# Patient Record
Sex: FEMALE | Race: BLACK OR AFRICAN AMERICAN | NOT HISPANIC OR LATINO
[De-identification: names, ages, dates, MRNs, and addresses within clinical notes are randomized per-mention and may not be internally consistent; named-entity substitution may affect disease eponyms.]

---

## 2021-07-08 ENCOUNTER — TRANSCRIPTION ENCOUNTER (OUTPATIENT)
Age: 61
End: 2021-07-08

## 2021-09-04 ENCOUNTER — TRANSCRIPTION ENCOUNTER (OUTPATIENT)
Age: 61
End: 2021-09-04

## 2023-02-13 PROBLEM — Z00.00 ENCOUNTER FOR PREVENTIVE HEALTH EXAMINATION: Status: ACTIVE | Noted: 2023-02-13

## 2023-03-27 ENCOUNTER — APPOINTMENT (OUTPATIENT)
Dept: OTOLARYNGOLOGY | Facility: CLINIC | Age: 63
End: 2023-03-27
Payer: COMMERCIAL

## 2023-03-27 DIAGNOSIS — D48.7 NEOPLASM OF UNCERTAIN BEHAVIOR OF OTHER SPECIFIED SITES: ICD-10-CM

## 2023-03-27 PROCEDURE — 99213 OFFICE O/P EST LOW 20 MIN: CPT | Mod: 25

## 2023-03-27 PROCEDURE — 31575 DIAGNOSTIC LARYNGOSCOPY: CPT

## 2023-06-15 ENCOUNTER — TRANSCRIPTION ENCOUNTER (OUTPATIENT)
Age: 63
End: 2023-06-15

## 2023-06-15 VITALS
RESPIRATION RATE: 16 BRPM | HEIGHT: 68 IN | OXYGEN SATURATION: 100 % | SYSTOLIC BLOOD PRESSURE: 144 MMHG | HEART RATE: 72 BPM | DIASTOLIC BLOOD PRESSURE: 83 MMHG | WEIGHT: 214.73 LBS | TEMPERATURE: 97 F

## 2023-06-15 NOTE — ASU PREOP CHECKLIST - NOTHING BY MOUTH SINCE
Doxycycline Pregnancy And Lactation Text: This medication is Pregnancy Category D and not consider safe during pregnancy. It is also excreted in breast milk but is considered safe for shorter treatment courses. Erythromycin Counseling:  I discussed with the patient the risks of erythromycin including but not limited to GI upset, allergic reaction, drug rash, diarrhea, increase in liver enzymes, and yeast infections. Tazorac Counseling:  Patient advised that medication is irritating and drying.  Patient may need to apply sparingly and wash off after an hour before eventually leaving it on overnight.  The patient verbalized understanding of the proper use and possible adverse effects of tazorac.  All of the patient's questions and concerns were addressed. Spironolactone Counseling: Patient advised regarding risks of diarrhea, abdominal pain, hyperkalemia, birth defects (for female patients), liver toxicity and renal toxicity. The patient may need blood work to monitor liver and kidney function and potassium levels while on therapy. The patient verbalized understanding of the proper use and possible adverse effects of spironolactone.  All of the patient's questions and concerns were addressed. 15-Timoteo-2023 20:00 Spironolactone Pregnancy And Lactation Text: This medication can cause feminization of the male fetus and should be avoided during pregnancy. The active metabolite is also found in breast milk. Topical Retinoid Pregnancy And Lactation Text: This medication is Pregnancy Category C. It is unknown if this medication is excreted in breast milk. Doxycycline Counseling:  Patient counseled regarding possible photosensitivity and increased risk for sunburn.  Patient instructed to avoid sunlight, if possible.  When exposed to sunlight, patients should wear protective clothing, sunglasses, and sunscreen.  The patient was instructed to call the office immediately if the following severe adverse effects occur:  hearing changes, easy bruising/bleeding, severe headache, or vision changes.  The patient verbalized understanding of the proper use and possible adverse effects of doxycycline.  All of the patient's questions and concerns were addressed. Dapsone Pregnancy And Lactation Text: This medication is Pregnancy Category C and is not considered safe during pregnancy or breast feeding. Topical Clindamycin Counseling: Patient counseled that this medication may cause skin irritation or allergic reactions.  In the event of skin irritation, the patient was advised to reduce the amount of the drug applied or use it less frequently.   The patient verbalized understanding of the proper use and possible adverse effects of clindamycin.  All of the patient's questions and concerns were addressed. Topical Sulfur Applications Pregnancy And Lactation Text: This medication is Pregnancy Category C and has an unknown safety profile during pregnancy. It is unknown if this topical medication is excreted in breast milk. Bactrim Counseling:  I discussed with the patient the risks of sulfa antibiotics including but not limited to GI upset, allergic reaction, drug rash, diarrhea, dizziness, photosensitivity, and yeast infections.  Rarely, more serious reactions can occur including but not limited to aplastic anemia, agranulocytosis, methemoglobinemia, blood dyscrasias, liver or kidney failure, lung infiltrates or desquamative/blistering drug rashes. Birth Control Pills Pregnancy And Lactation Text: This medication should be avoided if pregnant and for the first 30 days post-partum. Tetracycline Counseling: Patient counseled regarding possible photosensitivity and increased risk for sunburn.  Patient instructed to avoid sunlight, if possible.  When exposed to sunlight, patients should wear protective clothing, sunglasses, and sunscreen.  The patient was instructed to call the office immediately if the following severe adverse effects occur:  hearing changes, easy bruising/bleeding, severe headache, or vision changes.  The patient verbalized understanding of the proper use and possible adverse effects of tetracycline.  All of the patient's questions and concerns were addressed. Patient understands to avoid pregnancy while on therapy due to potential birth defects. Dapsone Counseling: I discussed with the patient the risks of dapsone including but not limited to hemolytic anemia, agranulocytosis, rashes, methemoglobinemia, kidney failure, peripheral neuropathy, headaches, GI upset, and liver toxicity.  Patients who start dapsone require monitoring including baseline LFTs and weekly CBCs for the first month, then every month thereafter.  The patient verbalized understanding of the proper use and possible adverse effects of dapsone.  All of the patient's questions and concerns were addressed. Benzoyl Peroxide Counseling: Patient counseled that medicine may cause skin irritation and bleach clothing.  In the event of skin irritation, the patient was advised to reduce the amount of the drug applied or use it less frequently.   The patient verbalized understanding of the proper use and possible adverse effects of benzoyl peroxide.  All of the patient's questions and concerns were addressed. Erythromycin Pregnancy And Lactation Text: This medication is Pregnancy Category B and is considered safe during pregnancy. It is also excreted in breast milk. High Dose Vitamin A Counseling: Side effects reviewed, pt to contact office should one occur. Use Enhanced Medication Counseling?: No Bactrim Pregnancy And Lactation Text: This medication is Pregnancy Category D and is known to cause fetal risk.  It is also excreted in breast milk. Topical Sulfur Applications Counseling: Topical Sulfur Counseling: Patient counseled that this medication may cause skin irritation or allergic reactions.  In the event of skin irritation, the patient was advised to reduce the amount of the drug applied or use it less frequently.   The patient verbalized understanding of the proper use and possible adverse effects of topical sulfur application.  All of the patient's questions and concerns were addressed. Birth Control Pills Counseling: Birth Control Pill Counseling: I discussed with the patient the potential side effects of OCPs including but not limited to increased risk of stroke, heart attack, thrombophlebitis, deep venous thrombosis, hepatic adenomas, breast changes, GI upset, headaches, and depression.  The patient verbalized understanding of the proper use and possible adverse effects of OCPs. All of the patient's questions and concerns were addressed. Azithromycin Pregnancy And Lactation Text: This medication is considered safe during pregnancy and is also secreted in breast milk. Tetracycline Pregnancy And Lactation Text: This medication is Pregnancy Category D and not consider safe during pregnancy. It is also excreted in breast milk. Detail Level: Zone Topical Retinoid counseling:  Patient advised to apply a pea-sized amount only at bedtime and wait 30 minutes after washing their face before applying.  If too drying, patient may add a non-comedogenic moisturizer. The patient verbalized understanding of the proper use and possible adverse effects of retinoids.  All of the patient's questions and concerns were addressed. Tazorac Pregnancy And Lactation Text: This medication is not safe during pregnancy. It is unknown if this medication is excreted in breast milk. Isotretinoin Pregnancy And Lactation Text: This medication is Pregnancy Category X and is considered extremely dangerous during pregnancy. It is unknown if it is excreted in breast milk. Isotretinoin Counseling: Patient should get monthly blood tests, not donate blood, not drive at night if vision affected, not share medication, and not undergo elective surgery for 6 months after tx completed. Side effects reviewed, pt to contact office should one occur. Minocycline Counseling: Patient advised regarding possible photosensitivity and discoloration of the teeth, skin, lips, tongue and gums.  Patient instructed to avoid sunlight, if possible.  When exposed to sunlight, patients should wear protective clothing, sunglasses, and sunscreen.  The patient was instructed to call the office immediately if the following severe adverse effects occur:  hearing changes, easy bruising/bleeding, severe headache, or vision changes.  The patient verbalized understanding of the proper use and possible adverse effects of minocycline.  All of the patient's questions and concerns were addressed. Azithromycin Counseling:  I discussed with the patient the risks of azithromycin including but not limited to GI upset, allergic reaction, drug rash, diarrhea, and yeast infections. Topical Clindamycin Pregnancy And Lactation Text: This medication is Pregnancy Category B and is considered safe during pregnancy. It is unknown if it is excreted in breast milk. High Dose Vitamin A Pregnancy And Lactation Text: High dose vitamin A therapy is contraindicated during pregnancy and breast feeding. Benzoyl Peroxide Pregnancy And Lactation Text: This medication is Pregnancy Category C. It is unknown if benzoyl peroxide is excreted in breast milk.

## 2023-06-15 NOTE — ASU PATIENT PROFILE, ADULT - FALL HARM RISK - UNIVERSAL INTERVENTIONS
Bed in lowest position, wheels locked, appropriate side rails in place/Call bell, personal items and telephone in reach/Instruct patient to call for assistance before getting out of bed or chair/Non-slip footwear when patient is out of bed/[place] to call system/Physically safe environment - no spills, clutter or unnecessary equipment/Purposeful Proactive Rounding/Room/bathroom lighting operational, light cord in reach

## 2023-06-15 NOTE — ASU PATIENT PROFILE, ADULT - NSICDXPASTSURGICALHX_GEN_ALL_CORE_FT
PAST SURGICAL HISTORY:  H/O colonoscopy     H/O tubal ligation     Hammer toe of left foot hammer toe

## 2023-06-16 ENCOUNTER — RESULT REVIEW (OUTPATIENT)
Age: 63
End: 2023-06-16

## 2023-06-16 ENCOUNTER — TRANSCRIPTION ENCOUNTER (OUTPATIENT)
Age: 63
End: 2023-06-16

## 2023-06-16 ENCOUNTER — APPOINTMENT (OUTPATIENT)
Dept: OTOLARYNGOLOGY | Facility: HOSPITAL | Age: 63
End: 2023-06-16

## 2023-06-16 ENCOUNTER — INPATIENT (INPATIENT)
Facility: HOSPITAL | Age: 63
LOS: 0 days | Discharge: HOME | DRG: 139 | End: 2023-06-17
Attending: SPECIALIST | Admitting: SPECIALIST
Payer: MEDICARE

## 2023-06-16 DIAGNOSIS — Z98.51 TUBAL LIGATION STATUS: Chronic | ICD-10-CM

## 2023-06-16 DIAGNOSIS — Z98.890 OTHER SPECIFIED POSTPROCEDURAL STATES: Chronic | ICD-10-CM

## 2023-06-16 DIAGNOSIS — M20.42 OTHER HAMMER TOE(S) (ACQUIRED), LEFT FOOT: Chronic | ICD-10-CM

## 2023-06-16 PROCEDURE — 88331 PATH CONSLTJ SURG 1 BLK 1SPC: CPT | Mod: 26

## 2023-06-16 PROCEDURE — 88307 TISSUE EXAM BY PATHOLOGIST: CPT | Mod: 26

## 2023-06-16 PROCEDURE — 42415 EXCISE PAROTID GLAND/LESION: CPT | Mod: RT

## 2023-06-16 DEVICE — SURGICEL 4 X 8": Type: IMPLANTABLE DEVICE | Status: FUNCTIONAL

## 2023-06-16 DEVICE — CLIP APPLIER ETHICON LIGACLIP 9 3/8" SMALL: Type: IMPLANTABLE DEVICE | Status: FUNCTIONAL

## 2023-06-16 RX ORDER — MORPHINE SULFATE 50 MG/1
4 CAPSULE, EXTENDED RELEASE ORAL ONCE
Refills: 0 | Status: DISCONTINUED | OUTPATIENT
Start: 2023-06-16 | End: 2023-06-16

## 2023-06-16 RX ORDER — FAMOTIDINE 10 MG/ML
40 INJECTION INTRAVENOUS DAILY
Refills: 0 | Status: DISCONTINUED | OUTPATIENT
Start: 2023-06-16 | End: 2023-06-17

## 2023-06-16 RX ORDER — HEPARIN SODIUM 5000 [USP'U]/ML
5000 INJECTION INTRAVENOUS; SUBCUTANEOUS ONCE
Refills: 0 | Status: DISCONTINUED | OUTPATIENT
Start: 2023-06-17 | End: 2023-06-17

## 2023-06-16 RX ORDER — ACETAMINOPHEN 500 MG
650 TABLET ORAL EVERY 6 HOURS
Refills: 0 | Status: DISCONTINUED | OUTPATIENT
Start: 2023-06-16 | End: 2023-06-17

## 2023-06-16 RX ORDER — ACETAMINOPHEN 500 MG
650 TABLET ORAL ONCE
Refills: 0 | Status: DISCONTINUED | OUTPATIENT
Start: 2023-06-16 | End: 2023-06-16

## 2023-06-16 RX ORDER — AMLODIPINE BESYLATE 2.5 MG/1
5 TABLET ORAL DAILY
Refills: 0 | Status: DISCONTINUED | OUTPATIENT
Start: 2023-06-16 | End: 2023-06-17

## 2023-06-16 RX ORDER — ONDANSETRON 8 MG/1
4 TABLET, FILM COATED ORAL ONCE
Refills: 0 | Status: DISCONTINUED | OUTPATIENT
Start: 2023-06-16 | End: 2023-06-16

## 2023-06-16 RX ORDER — OXYCODONE HYDROCHLORIDE 5 MG/1
5 TABLET ORAL EVERY 4 HOURS
Refills: 0 | Status: DISCONTINUED | OUTPATIENT
Start: 2023-06-16 | End: 2023-06-17

## 2023-06-16 RX ORDER — ONDANSETRON 8 MG/1
4 TABLET, FILM COATED ORAL ONCE
Refills: 0 | Status: DISCONTINUED | OUTPATIENT
Start: 2023-06-16 | End: 2023-06-17

## 2023-06-16 RX ORDER — PANTOPRAZOLE SODIUM 20 MG/1
40 TABLET, DELAYED RELEASE ORAL
Refills: 0 | Status: DISCONTINUED | OUTPATIENT
Start: 2023-06-16 | End: 2023-06-17

## 2023-06-16 RX ORDER — OXYCODONE AND ACETAMINOPHEN 5; 325 MG/1; MG/1
2 TABLET ORAL ONCE
Refills: 0 | Status: DISCONTINUED | OUTPATIENT
Start: 2023-06-16 | End: 2023-06-16

## 2023-06-16 RX ORDER — LANOLIN ALCOHOL/MO/W.PET/CERES
3 CREAM (GRAM) TOPICAL AT BEDTIME
Refills: 0 | Status: DISCONTINUED | OUTPATIENT
Start: 2023-06-16 | End: 2023-06-17

## 2023-06-16 RX ORDER — CALCIUM CARBONATE 500(1250)
2 TABLET ORAL EVERY 4 HOURS
Refills: 0 | Status: DISCONTINUED | OUTPATIENT
Start: 2023-06-16 | End: 2023-06-17

## 2023-06-16 RX ORDER — SODIUM CHLORIDE 9 MG/ML
1000 INJECTION, SOLUTION INTRAVENOUS
Refills: 0 | Status: DISCONTINUED | OUTPATIENT
Start: 2023-06-16 | End: 2023-06-16

## 2023-06-16 RX ADMIN — FAMOTIDINE 40 MILLIGRAM(S): 10 INJECTION INTRAVENOUS at 23:17

## 2023-06-16 RX ADMIN — SODIUM CHLORIDE 75 MILLILITER(S): 9 INJECTION, SOLUTION INTRAVENOUS at 13:42

## 2023-06-16 NOTE — H&P ADULT - NSHPPHYSICALEXAM_GEN_ALL_CORE
Gen: recent anesthesia  Oral: no bleeding, no masses  Neck: incision c/d/i, ROZ x1 holding suction with sanguinous output, neck soft and flat  Resp: NLB

## 2023-06-16 NOTE — H&P ADULT - HISTORY OF PRESENT ILLNESS
63F with parotid mass s/p excision of tail of parotid mass. No intra op complications - see op note for details.

## 2023-06-16 NOTE — H&P ADULT - ASSESSMENT
63F w/ hx HTN s/p excision of tail of parotid mass. Frozen section c/w pleomorphic adenoma  -Admit to [facility] ([facility]  -Pain control  -monitor ROZ  -continue amlodipine 5 mg for htn   -SCD, SQH  -diet as tolerated  -ambulate  -Dispo pending post operative course and drain monitoring

## 2023-06-17 ENCOUNTER — TRANSCRIPTION ENCOUNTER (OUTPATIENT)
Age: 63
End: 2023-06-17

## 2023-06-17 VITALS
OXYGEN SATURATION: 97 % | SYSTOLIC BLOOD PRESSURE: 156 MMHG | HEART RATE: 66 BPM | RESPIRATION RATE: 18 BRPM | DIASTOLIC BLOOD PRESSURE: 85 MMHG | TEMPERATURE: 98 F

## 2023-06-17 PROCEDURE — 88307 TISSUE EXAM BY PATHOLOGIST: CPT

## 2023-06-17 PROCEDURE — C1889: CPT

## 2023-06-17 PROCEDURE — 88331 PATH CONSLTJ SURG 1 BLK 1SPC: CPT

## 2023-06-17 RX ORDER — OXYCODONE HYDROCHLORIDE 5 MG/1
1 TABLET ORAL
Qty: 12 | Refills: 0
Start: 2023-06-17 | End: 2023-06-19

## 2023-06-17 RX ADMIN — PANTOPRAZOLE SODIUM 40 MILLIGRAM(S): 20 TABLET, DELAYED RELEASE ORAL at 06:16

## 2023-06-17 RX ADMIN — Medication 650 MILLIGRAM(S): at 12:55

## 2023-06-17 RX ADMIN — AMLODIPINE BESYLATE 5 MILLIGRAM(S): 2.5 TABLET ORAL at 06:16

## 2023-06-17 RX ADMIN — Medication 650 MILLIGRAM(S): at 12:13

## 2023-06-17 NOTE — DISCHARGE NOTE PROVIDER - NSDCFUADDINST_GEN_ALL_CORE_FT
Pain control:  Please continue to take tylenol as needed for pain. DO NOT exceed 4000 mg per day. A prescription for oxycodone has been sent, please take as needed for more severe pain.    Medications:  Please continue to take all of your other medications as prescribed.     Diet: Please resume your regular diet.    Please avoid heavy lifting and strenuous activity for 2 weeks following your surgery.    Incision care:  Please monitor your incision sites for any sings of infection including redness, swelling, discharge, or increased pain. If you experience any of these please contact your doctor.  Please avoid swimming and bathing until your follow up appointment. You may shower starting 48 hours after surgery, allow soap and water to flow over the wound, do not scrub, pat dry when done.

## 2023-06-17 NOTE — DISCHARGE NOTE NURSING/CASE MANAGEMENT/SOCIAL WORK - PATIENT PORTAL LINK FT
You can access the FollowMyHealth Patient Portal offered by [facility] by registering at the following website: [email or web address]. By joining [facility]’s FollowMyHealth portal, you will also be able to view your health information using other applications (apps) compatible with our system.

## 2023-06-17 NOTE — PROGRESS NOTE ADULT - SUBJECTIVE AND OBJECTIVE BOX
OTOLARYNGOLOGY (ENT) PROGRESS NOTE    PATIENT: SATHYA DEMARCO  MRN: [id number]  : 56  [id number]  DATE OF SERVICE:  23  			         ID:SATHYA DEMARCO is a  63yFemale with R parotid mass now s/p excison of R parotid tail mass , with frozen positive for pleomorphic adenoma.    Subjective/ Interval: Patient seen and examined at bedside. She reports pain well controlled. She is ambulating well. She is tolerating regular diet and feels ready to go home.    ALLERGIES:  No Known Allergies      MEDICATIONS:  Antiinfectives:     IV fluids:    Hematologic/Anticoagulation:  heparin   Injectable 5000 Unit(s) SubCutaneous Once    Pain medications/Neuro:  acetaminophen     Tablet .. 650 milliGRAM(s) Oral every 6 hours PRN  melatonin 3 milliGRAM(s) Oral at bedtime PRN  ondansetron Injectable 4 milliGRAM(s) IV Push Once PRN  oxyCODONE    IR 5 milliGRAM(s) Oral every 4 hours PRN    Endocrine Medications:     All other standing medications:   amLODIPine   Tablet 5 milliGRAM(s) Oral daily  pantoprazole    Tablet 40 milliGRAM(s) Oral before breakfast    All other PRN medications:  calcium carbonate    500 mG (Tums) Chewable 2 Tablet(s) Chew every 4 hours PRN  famotidine    Tablet 40 milliGRAM(s) Oral daily PRN    Vital Signs Last 24 Hrs  T(C): 36.5 (2023 08:38), Max: 36.5 (2023 13:15)  T(F): 97.7 (2023 08:38), Max: 97.7 (2023 13:15)  HR: 66 (2023 08:38) (64 - 84)  BP: 156/85 (2023 08:38) (107/62 - 156/85)  BP(mean): 87 (2023 14:15) (82 - 92)  RR: 18 (2023 08:38) (13 - 19)  SpO2: 97% (2023 08:38) (96% - 100%)    Parameters below as of 2023 08:38  Patient On (Oxygen Delivery Method): room air          -16 @ 07:01  -  06-17 @ 07:00  --------------------------------------------------------  IN:    Lactated Ringers: 1075 mL  Total IN: 1075 mL    OUT:    Blood Loss (mL): 5 mL    Bulb (mL): 0 mL    Voided (mL): 300 mL  Total OUT: 305 mL    Total NET: 770 mL       @ 07: @ 09:40  --------------------------------------------------------  IN:  Total IN: 0 mL    OUT:    Voided (mL): 400 mL  Total OUT: 400 mL    Total NET: -400 mL          23 @ 07:01  -  23 @ 07:00  --------------------------------------------------------  IN:  Total IN: 0 mL    OUT:    Bulb (mL): 0 mL  Total OUT: 0 mL    Total NET: 0 mL              PHYSICAL EXAM:  General: NAD, A+Ox3  Respiratory: No respiratory distress, stridor, or stertor  Voice quality: normal  Face:  Symmetric, with minimal weakness of R marginal mandibular branch  OU: EOMI  AD: Pinna wnl  AS: Pinna wnl  Nose: nasal cavity clear bilaterally, inferior turbinates normal, mucosa normal without crusting or bleeding  OC/OP: tongue normal, floor of mouth WNL, no masses or lesions, OP clear  Neck: soft/flat, R incision c/d/i, ROZ drain with minimal serosanguinous output         LABS             Coagulation Studies-       Endocrine Panel-                MICROBIOLOGY:        RADIOLOGY & ADDITIONAL STUDIES:

## 2023-06-17 NOTE — DISCHARGE NOTE PROVIDER - NSDCCPTREATMENT_GEN_ALL_CORE_FT
Jessica Adan, Attending Physician: 10M with hx of asthma here for abdominal pain, RLQ. Patient with fever at home 101. +vomiting and diarrhea since Sunday, emesis NBNB. No testicular pain, no dysuria, no rhinorrhea, cough.
PRINCIPAL PROCEDURE  Procedure: Parotid mass excision  Findings and Treatment:

## 2023-06-17 NOTE — DISCHARGE NOTE PROVIDER - NSDCMRMEDTOKEN_GEN_ALL_CORE_FT
amLODIPine 5 mg oral tablet: 1 orally once a day  oxyCODONE 5 mg oral tablet: 1 tab(s) orally every 6 hours as needed for  severe pain MDD: 4  Protonix 40 mg oral delayed release tablet: 1 orally once a day

## 2023-06-17 NOTE — DISCHARGE NOTE PROVIDER - CARE PROVIDER_API CALL
Dave Colvin  Otolaryngology  [address] [place] [address], [address]  [place] [address]  Phone: [phone]  Fax: [phone]  Follow Up Time:

## 2023-06-17 NOTE — DISCHARGE NOTE PROVIDER - HOSPITAL COURSE
63F with parotid mass s/p excision of tail of parotid mass. No intra op complications - see op note for details. On POD1, she was tolerating diet and pain well controlled. Her ROZ drain was removed and she was stable for discharge.

## 2023-06-17 NOTE — PROGRESS NOTE ADULT - ASSESSMENT
Assessment and Plan:  SATHYA DEMARCO is a  63yFemale s/p excision of R parotid tail tumor 6/16.    PLAN:  - will remove ROZ drain this morning  - continue pain control and anti-emetics as needed  - continue home amlodipine  - continue regular diet  - discharge home this morning      [facility] at [phone] with any questions/concerns.    Jory Edgar  06-17-23 @ 09:40

## 2023-06-19 PROBLEM — I10 ESSENTIAL (PRIMARY) HYPERTENSION: Chronic | Status: ACTIVE | Noted: 2023-06-15

## 2023-06-19 PROBLEM — K21.9 GASTRO-ESOPHAGEAL REFLUX DISEASE WITHOUT ESOPHAGITIS: Chronic | Status: ACTIVE | Noted: 2023-06-15

## 2023-06-21 LAB — SURGICAL PATHOLOGY STUDY: SIGNIFICANT CHANGE UP

## 2023-06-22 DIAGNOSIS — I10 ESSENTIAL (PRIMARY) HYPERTENSION: ICD-10-CM

## 2023-06-22 DIAGNOSIS — D11.0 BENIGN NEOPLASM OF PAROTID GLAND: ICD-10-CM

## 2023-06-22 DIAGNOSIS — K21.9 GASTRO-ESOPHAGEAL REFLUX DISEASE WITHOUT ESOPHAGITIS: ICD-10-CM

## 2023-07-05 ENCOUNTER — APPOINTMENT (OUTPATIENT)
Dept: OTOLARYNGOLOGY | Facility: CLINIC | Age: 63
End: 2023-07-05
Payer: MEDICARE

## 2023-07-05 DIAGNOSIS — R13.10 DYSPHAGIA, UNSPECIFIED: ICD-10-CM

## 2023-07-05 DIAGNOSIS — R49.0 DYSPHONIA: ICD-10-CM

## 2023-07-05 PROCEDURE — 31575 DIAGNOSTIC LARYNGOSCOPY: CPT | Mod: 79

## 2023-10-01 NOTE — PRE-ANESTHESIA EVALUATION ADULT - HEIGHT IN INCHES
PROCEDURE DETAILS    Preoperative Diagnosis:  Postlaminectomy syndrome of cervical region, M96.1    Postoperative Diagnosis:  Postlaminectomy syndrome of cervical region, M96.1    Surgeon: Sandra Hernandez  Resident/Fellow/Other Assistant: Shamika Bennett    Procedure:  1. REPLACEMENT OF SPINAL  PUMP    Anesthesia: GA  Estimated Blood Loss: 20  Findings: none         Operative Report:   Patient was taken to the operating room #4 was placed into a supine positioning under general anesthesia patient received 2 g of vancomycin and 3 g of Kefzol  perioperatively the site of the pump was identified local anesthetic injected at the site of the pump but 10 cc of a mixture of 2% lidocaine 0.5% Marcaine with epi incision was achieved with a knife hemostasis was achieved and the pump was delivered from  the pocket the sideport of the pump was accessed and 1 cc of clear CSF was withdrawn the pump was disconnected a new pump Medtronic 20 mL was prepped with a solution of morphine preservative-free 5 mg/mL program to deliver a 0.7 mg of morphine per day  with PTM 0.1 mg locked out for 3 hours maximum 3 injections in 24 hours was set with the alarm date set for December 1, 2023 this pump was connected to the catheter and placed back into the pocket irrigation was achieved and Vanco powder was sprayed into  the wound the wound was closed with 3-0 Vicryl 4-0 Vicryl Dermabond was applied to the skin and an abdominal binder was applied patient awakened from general anesthesia taken to the recovery room allowed to recover for sufficient amount of time and then  was discharged home in stable condition with patient was advised to follow-up with the pain clinic within 2 weeks or if needed any sooner  Thank you for allowing me to participate in the care of this patient    Please note this report has been  produced using speech recognition software and may contain errors related to that system including grammar , punctuation  and  spelling as well as words and phrases that may be inappropriate. If there are questions or concerns, please feel free to contact me to clarify.                        Attestation:   Note Completion:  Attending Attestation I performed the procedure without a resident         Electronic Signatures:  Sandra Hernandez)  (Signed 06-Sep-2023 10:59)   Authored: Post-Operative Note, Chart Review, Note Completion      Last Updated: 06-Sep-2023 10:59 by Sandra Hernandez)    8

## 2023-11-06 ENCOUNTER — APPOINTMENT (OUTPATIENT)
Dept: OTOLARYNGOLOGY | Facility: CLINIC | Age: 63
End: 2023-11-06
Payer: MEDICARE

## 2023-11-06 PROCEDURE — 99214 OFFICE O/P EST MOD 30 MIN: CPT | Mod: 25

## 2023-11-06 PROCEDURE — 31575 DIAGNOSTIC LARYNGOSCOPY: CPT

## 2023-11-07 ENCOUNTER — APPOINTMENT (OUTPATIENT)
Age: 63
End: 2023-11-07
Payer: MEDICARE

## 2023-11-07 VITALS
TEMPERATURE: 97.3 F | BODY MASS INDEX: 33.04 KG/M2 | DIASTOLIC BLOOD PRESSURE: 72 MMHG | HEIGHT: 68 IN | HEART RATE: 100 BPM | WEIGHT: 218 LBS | SYSTOLIC BLOOD PRESSURE: 112 MMHG | OXYGEN SATURATION: 97 % | RESPIRATION RATE: 16 BRPM

## 2023-11-07 DIAGNOSIS — A04.8 OTHER SPECIFIED BACTERIAL INTESTINAL INFECTIONS: ICD-10-CM

## 2023-11-07 PROCEDURE — 99204 OFFICE O/P NEW MOD 45 MIN: CPT

## 2023-12-05 ENCOUNTER — NON-APPOINTMENT (OUTPATIENT)
Age: 63
End: 2023-12-05

## 2024-01-03 ENCOUNTER — APPOINTMENT (OUTPATIENT)
Dept: BREAST CENTER | Facility: CLINIC | Age: 64
End: 2024-01-03
Payer: MEDICARE

## 2024-01-03 ENCOUNTER — APPOINTMENT (OUTPATIENT)
Dept: MAMMOGRAPHY | Facility: HOSPITAL | Age: 64
End: 2024-01-03
Payer: MEDICARE

## 2024-01-03 ENCOUNTER — OUTPATIENT (OUTPATIENT)
Dept: OUTPATIENT SERVICES | Facility: HOSPITAL | Age: 64
LOS: 1 days | End: 2024-01-03
Payer: MEDICARE

## 2024-01-03 ENCOUNTER — RESULT REVIEW (OUTPATIENT)
Age: 64
End: 2024-01-03

## 2024-01-03 VITALS
HEIGHT: 68 IN | SYSTOLIC BLOOD PRESSURE: 139 MMHG | HEART RATE: 114 BPM | WEIGHT: 219 LBS | DIASTOLIC BLOOD PRESSURE: 88 MMHG | BODY MASS INDEX: 33.19 KG/M2

## 2024-01-03 DIAGNOSIS — Z78.9 OTHER SPECIFIED HEALTH STATUS: ICD-10-CM

## 2024-01-03 DIAGNOSIS — Z98.51 TUBAL LIGATION STATUS: Chronic | ICD-10-CM

## 2024-01-03 DIAGNOSIS — M20.42 OTHER HAMMER TOE(S) (ACQUIRED), LEFT FOOT: Chronic | ICD-10-CM

## 2024-01-03 DIAGNOSIS — Z98.890 OTHER SPECIFIED POSTPROCEDURAL STATES: Chronic | ICD-10-CM

## 2024-01-03 DIAGNOSIS — Z86.79 PERSONAL HISTORY OF OTHER DISEASES OF THE CIRCULATORY SYSTEM: ICD-10-CM

## 2024-01-03 PROCEDURE — G0279: CPT

## 2024-01-03 PROCEDURE — G0279: CPT | Mod: 26

## 2024-01-03 PROCEDURE — 77065 DX MAMMO INCL CAD UNI: CPT | Mod: 26,LT

## 2024-01-03 PROCEDURE — 77065 DX MAMMO INCL CAD UNI: CPT

## 2024-01-03 PROCEDURE — 99204 OFFICE O/P NEW MOD 45 MIN: CPT

## 2024-01-03 RX ORDER — LOSARTAN POTASSIUM 50 MG/1
50 TABLET, FILM COATED ORAL
Refills: 0 | Status: ACTIVE | COMMUNITY

## 2024-01-06 NOTE — HISTORY OF PRESENT ILLNESS
[FreeTextEntry1] : 63 year old female, self-referred, who presents for initial evaluation regarding abnormal mammogram. Patient completed her imaging at [facility]. Most recently, patient had a B/L screening mammogram on 11/22/23, BI-RADS 0, incomplete, with questionable L lower outer breast calcifications and advised follow up additional imaging views and possible L breast stereotactic guided core biopsy. Patient reports one incidence of clear, non-spontaneous, non-bloody L nipple discharge, a few weeks ago. Patient denies palpable masses, skin changes.   Patient reports family history of breast cancer in 4 maternal first cousins - age 20's, 30's, 40's and 50's respectively. Denies famhx of ovarian cancer. Patient has not had genetic testing performed.    Arin Lifetime Risk 8.5%

## 2024-01-06 NOTE — PAST MEDICAL HISTORY
[Menarche Age ____] : age at menarche was [unfilled] [Menopause Age____] : age at menopause was [unfilled] [Total Preg ___] : G[unfilled] [Live Births ___] : P[unfilled]  [Age At Live Birth ___] : Age at live birth: [unfilled] [History of Hormone Replacement Treatment] : has no history of hormone replacement treatment [FreeTextEntry6] : No [FreeTextEntry7] : No [FreeTextEntry8] : No

## 2024-01-06 NOTE — DATA REVIEWED
[FreeTextEntry1] : 11/22/23 ([place] [facility]) B/L sMMG: heterogeneously dense. L breast questionable calcifications in lower inner and lower outer breast spanning anterior through mid depth. BI-RADS 0, incomplete FOLLOW UP: additional imaging views and possible L breast stereotactic guided core biopsy.

## 2024-01-06 NOTE — ASSESSMENT
[FreeTextEntry1] : 63 year old female here for initial evaluation regarding abnormal mammogram on 11/22/23, BI-RADS 0, incomplete, with L lower outer breast calcifications, possibly requiring L breast stereotactic guided core biopsy.  I personally reviewed her mammogram and feel the calcifications are very suspicious for cancer.   PRISCILLA 8.5%. Patient with one incidence of clear, non-spontaneous, non-bloody L nipple discharge. Physical exam WNL. Most recent imaging reviewed, B/L sMMG 11/22/23, BIRADS-0. Plan for L diagnostic mammogram today and re-examination in 2 weeks.  Patient verbalizes understanding and is in agreement with the plan.

## 2024-01-12 ENCOUNTER — RESULT REVIEW (OUTPATIENT)
Age: 64
End: 2024-01-12

## 2024-01-12 ENCOUNTER — APPOINTMENT (OUTPATIENT)
Dept: MAMMOGRAPHY | Facility: HOSPITAL | Age: 64
End: 2024-01-12
Payer: MEDICARE

## 2024-01-12 ENCOUNTER — OUTPATIENT (OUTPATIENT)
Dept: OUTPATIENT SERVICES | Facility: HOSPITAL | Age: 64
LOS: 1 days | End: 2024-01-12
Payer: MEDICARE

## 2024-01-12 DIAGNOSIS — Z98.51 TUBAL LIGATION STATUS: Chronic | ICD-10-CM

## 2024-01-12 DIAGNOSIS — Z98.890 OTHER SPECIFIED POSTPROCEDURAL STATES: Chronic | ICD-10-CM

## 2024-01-12 DIAGNOSIS — M20.42 OTHER HAMMER TOE(S) (ACQUIRED), LEFT FOOT: Chronic | ICD-10-CM

## 2024-01-12 PROBLEM — R92.8 ABNORMAL FINDING ON BREAST IMAGING: Status: ACTIVE | Noted: 2024-01-02

## 2024-01-12 PROCEDURE — 77065 DX MAMMO INCL CAD UNI: CPT

## 2024-01-12 PROCEDURE — A4648: CPT

## 2024-01-12 PROCEDURE — 19081 BX BREAST 1ST LESION STRTCTC: CPT

## 2024-01-12 PROCEDURE — 88360 TUMOR IMMUNOHISTOCHEM/MANUAL: CPT

## 2024-01-12 PROCEDURE — 88360 TUMOR IMMUNOHISTOCHEM/MANUAL: CPT | Mod: 26

## 2024-01-12 PROCEDURE — 76098 X-RAY EXAM SURGICAL SPECIMEN: CPT | Mod: 26

## 2024-01-12 PROCEDURE — 19082 BX BREAST ADD LESION STRTCTC: CPT

## 2024-01-12 PROCEDURE — 77065 DX MAMMO INCL CAD UNI: CPT | Mod: 26,LT

## 2024-01-12 PROCEDURE — 88305 TISSUE EXAM BY PATHOLOGIST: CPT

## 2024-01-12 PROCEDURE — 88305 TISSUE EXAM BY PATHOLOGIST: CPT | Mod: 26

## 2024-01-12 PROCEDURE — 19081 BX BREAST 1ST LESION STRTCTC: CPT | Mod: LT

## 2024-01-12 PROCEDURE — 19082 BX BREAST ADD LESION STRTCTC: CPT | Mod: LT

## 2024-01-12 NOTE — PHYSICAL EXAM
[Supple] : supple [Examined in the supine and seated position] : examined in the supine and seated position [No Supraclavicular Adenopathy] : no supraclavicular adenopathy [No dominant masses] : no dominant masses in right breast  [No dominant masses] : no dominant masses left breast [No Nipple Retraction] : no left nipple retraction [No Nipple Discharge] : no left nipple discharge [No Axillary Lymphadenopathy] : no left axillary lymphadenopathy

## 2024-01-15 ENCOUNTER — TRANSCRIPTION ENCOUNTER (OUTPATIENT)
Age: 64
End: 2024-01-15

## 2024-01-15 LAB
SURGICAL PATHOLOGY STUDY: SIGNIFICANT CHANGE UP

## 2024-01-16 ENCOUNTER — NON-APPOINTMENT (OUTPATIENT)
Age: 64
End: 2024-01-16

## 2024-01-17 ENCOUNTER — APPOINTMENT (OUTPATIENT)
Dept: BREAST CENTER | Facility: CLINIC | Age: 64
End: 2024-01-17
Payer: MEDICARE

## 2024-01-17 VITALS
WEIGHT: 219 LBS | BODY MASS INDEX: 33.19 KG/M2 | HEIGHT: 68 IN | DIASTOLIC BLOOD PRESSURE: 83 MMHG | HEART RATE: 80 BPM | SYSTOLIC BLOOD PRESSURE: 132 MMHG

## 2024-01-17 DIAGNOSIS — R92.8 OTHER ABNORMAL AND INCONCLUSIVE FINDINGS ON DIAGNOSTIC IMAGING OF BREAST: ICD-10-CM

## 2024-01-17 PROCEDURE — 99215 OFFICE O/P EST HI 40 MIN: CPT

## 2024-01-21 NOTE — ASSESSMENT
[FreeTextEntry1] : 63 year old female presents for follow up evaluation regarding newly diagnosed L DCIS, intermediate nuclear grade and necrosis; calcifications associated with DCIS; ER neg/DE neg s/p stereotactic biopsy x2 sites (anterior and posterior) of a 10cm area of branching calcifications at L 6:00. Recommendation on concordance report for surgical or oncologic management. First seen on screening mammogram.   Discussed importance and implication of genetic testing in regards to her family history and offspring. Patient would NOT like to go forward with genetic testing at this time.   Discussed patient's diagnosis in detail and answered any questions. Plan for L bracketed lumpectomy and SLNB, pending PCP clearance. Patient to see plastic surgeon Dr. Blakely to discuss lift/mastopexy. Surgical consent obtained today; surgical coordinator aware. Patient met with nurse navigator Arline in office today. Baseline sozo measurement obtained today. Patient verbalized understanding and agreement of plan.

## 2024-01-21 NOTE — HISTORY OF PRESENT ILLNESS
[FreeTextEntry1] : 63 year old female presents for follow up evaluation regarding newly diagnosed L DCIS, intermediate nuclear grade and necrosis; calcifications associated with DCIS; ER neg/CA neg s/p stereotactic biopsy x2 sites (anterior and posterior) of a 10cm area of branching calcifications at L 6:00. Recommendation on concordance report for surgical or oncologic management.   First noted on abnormal mammogram -- B/L sMMG 11/22/23 BIRADS-0 at [facility] revealed questionable L IOQ calcifications. Her follow up Left DX MMG on 01/03/24 again showed L 6:00 calcifications extending from within 15 mm of the nipple, total extent approximately 10 cm, highly suspicious for DCIS, BI-RADS 4C with recommended 2 site stereotactic biopsy.   Of note, at initial office visit the patient reported one incidence of clear, non-spontaneous, non-bloody L nipple discharge in late Nov 2023. Patient denies palpable masses or skin changes.   Patient reports family history of breast cancer in 4 maternal first cousins - age 20's, 30's, 40's and 50's respectively. Denies famhx of ovarian cancer. Patient has not had genetic testing performed. Patient has 1 biological child (daughter age 46).     Arin Lifetime Risk 8.5%  Patient reports history of HTN, acid reflux. Denies history of heart disease, DM, blood clots, sleep apnea, COPD, issues with anesthesia. Patient denies any known drug allergies.  Patient has been set up with [employer].

## 2024-01-21 NOTE — PLAN
[TextEntry] : -L bracketed lumpectomy, L SLNB  -PCP Clearance -plastic surgeon Dr. Zora morales today

## 2024-01-21 NOTE — DATA REVIEWED
[FreeTextEntry1] : 11/22/23 ([facility]) B/L sMMG: heterogeneously dense. L breast questionable calcifications in lower inner and lower outer breast spanning anterior through mid depth. BI-RADS 0, incomplete FOLLOW UP: additional imaging views and possible L breast stereotactic guided core biopsy.  01/03/24 ([facility]) Left DX MMG: There are scattered areas of fibroglandular density. There are fine linear branching calcifications extending from within 15 mm of the nipple along 6:00, total extent approximately 10 cm, highly suspicious for DCIS. BI-RADS 4C, Suspicious. FOLLOW UP: Recommend 2 site stereotactic biopsy of the anterior and posterior extent.  01/12/24 ([facility]) L stereotactic biopsy x 2 sites: Site 1 - Left 6:00, 10cm area of branching calficications, posterior: DCIS, cribriform and micropapillary types, intermediate nuclear grade and marked necrosis; calcifications associated with DCIS Site 2 - Left 6:00, 10cm area of branching calcifications, anterior: DCIS, intermediate nuclear grade and necrosis; calcifications associated with DCIS. Concordant, malignant. RECOMMENDATIONL Surgical or oncologic management. ER neg/OR neg

## 2024-01-23 ENCOUNTER — APPOINTMENT (OUTPATIENT)
Dept: PLASTIC SURGERY | Facility: CLINIC | Age: 64
End: 2024-01-23
Payer: MEDICARE

## 2024-01-23 VITALS — HEIGHT: 68 IN | WEIGHT: 219 LBS | BODY MASS INDEX: 33.19 KG/M2

## 2024-01-23 PROCEDURE — 99204 OFFICE O/P NEW MOD 45 MIN: CPT

## 2024-01-24 ENCOUNTER — NON-APPOINTMENT (OUTPATIENT)
Age: 64
End: 2024-01-24

## 2024-01-24 NOTE — HISTORY OF PRESENT ILLNESS
[FreeTextEntry1] : 62 y/o female with newly diagnosed left DCIS referred by Dr. Rankin presents for initial consultation for breast reconstruction options after left lumpectomy. Patient had left breast biopsy on 01/12/2024 and left mammogram on 01/03/2024. Patient does not think she will not need radiation or chemotherapy. Patient did not have genetic test. Patient reports family history of breast cancer in 4 maternal first cousins. Denies family history of ovarian cancer.  No history of bleeding or clotting disorder.  PE Gen - NAD Bilateral breasts with grade III ptosis SN-N R32 L34  N-IMF R13 L14  A/P 64 yo Female with newly diagnosed left DCIS referred to discuss reconstruction options. The idea of an oncoplastic reduction on the left with a symmetrizing procedure on the right was introduced to the patient. The wise pattern incisions were demonstrated and the anticipated recovery reviewed. We also discussed the risks of a breast reduction procedure. I reviewed the risks of bleeding, infection, wound healing issues, scar formation, loss of nipple sensation, nipple necrosis, persistent asymmetry and the need for additional operations. After reviewing these risks, Pilar expressed significant reservations. She had issue with the extensive scarring and risks to the contralateral, unaffected side. I discussed the implications of deferring an oncoplastic type procedure; namely, the potential for asymmetry with contralateral side and a defect of the left breast. She fully understands and believes at this time that these risks are more acceptable than the alternative. She is aware that we can perform these surgeries in the future and we will be available should this be the case.

## 2024-01-31 ENCOUNTER — APPOINTMENT (OUTPATIENT)
Dept: BREAST CENTER | Facility: CLINIC | Age: 64
End: 2024-01-31
Payer: MEDICARE

## 2024-01-31 VITALS
DIASTOLIC BLOOD PRESSURE: 88 MMHG | WEIGHT: 219 LBS | HEART RATE: 104 BPM | BODY MASS INDEX: 33.19 KG/M2 | HEIGHT: 68 IN | SYSTOLIC BLOOD PRESSURE: 147 MMHG

## 2024-01-31 PROCEDURE — 99215 OFFICE O/P EST HI 40 MIN: CPT

## 2024-02-05 NOTE — DATA REVIEWED
[FreeTextEntry1] : 11/22/23 ([facility]) B/L sMMG: heterogeneously dense. L breast questionable calcifications in lower inner and lower outer breast spanning anterior through mid depth. BI-RADS 0, incomplete FOLLOW UP: additional imaging views and possible L breast stereotactic guided core biopsy.  01/03/24 ([facility]) Left DX MMG: There are scattered areas of fibroglandular density. There are fine linear branching calcifications extending from within 15 mm of the nipple along 6:00, total extent approximately 10 cm, highly suspicious for DCIS. BI-RADS 4C, Suspicious. FOLLOW UP: Recommend 2 site stereotactic biopsy of the anterior and posterior extent.  01/12/24 ([facility]) L stereotactic biopsy x 2 sites: Site 1 - Left 6:00, 10cm area of branching calficications, posterior: DCIS, cribriform and micropapillary types, intermediate nuclear grade and marked necrosis; calcifications associated with DCIS Site 2 - Left 6:00, 10cm area of branching calcifications, anterior: DCIS, intermediate nuclear grade and necrosis; calcifications associated with DCIS. Concordant, malignant. RECOMMENDATIONL Surgical or oncologic management. ER neg/OK neg

## 2024-02-05 NOTE — HISTORY OF PRESENT ILLNESS
[FreeTextEntry1] : 63 year old female presents for follow up evaluation regarding newly diagnosed L DCIS, intermediate nuclear grade and necrosis; calcifications associated with DCIS; ER neg/WA neg s/p stereotactic biopsy x2 sites (anterior and posterior) of a 10cm area of branching calcifications at L 6:00. Recommendation on concordance report for surgical or oncologic management.   First noted on abnormal mammogram -- B/L sMMG 11/22/23 BIRADS-0 at [facility] revealed questionable L IOQ calcifications. Her follow up Left DX MMG on 01/03/24 again showed L 6:00 calcifications extending from within 15 mm of the nipple, total extent approximately 10 cm, highly suspicious for DCIS, BI-RADS 4C with recommended 2 site stereotactic biopsy.   Since LOV 1/17/24, patient met with plastic surgeon Dr. Blakely who discussed oncoplastic reduction on the left with a symmetrizing procedure on the right.   Of note, at initial office visit the patient reported one incidence of clear, non-spontaneous, non-bloody L nipple discharge in late Nov 2023. Patient denies palpable masses or skin changes.   Patient reports family history of breast cancer in 4 maternal first cousins - age 20's, 30's, 40's and 50's respectively. Denies famhx of ovarian cancer. Patient has 1 biological child (daughter age 46). Declined genetic testing.    Arin Lifetime Risk 8.5%  Patient reports history of HTN, acid reflux. Denies history of heart disease, DM, blood clots, sleep apnea, COPD, issues with anesthesia. Patient denies any known drug allergies.  Patient has been set up with [email or web address].

## 2024-02-05 NOTE — ASSESSMENT
[FreeTextEntry1] : 63 year old female presents for follow up evaluation regarding newly diagnosed L DCIS, intermediate nuclear grade and necrosis; calcifications associated with DCIS; ER neg/UT neg s/p stereotactic biopsy x2 sites (anterior and posterior) of a 10cm area of branching calcifications at L 6:00. Recommendation on concordance report for surgical or oncologic management. First seen on screening mammogram.   Patient declined genetic testing.   Since LOV 1/17/24, patient met with plastic surgeon Dr. Blakely who discussed oncoplastic reduction on the left with a symmetrizing procedure on the right.   Discussed patient's diagnosis in detail and answered any questions. Plan for L bracketed lumpectomy and SLNB and plastic surgery as noted above, pending PCP clearance. Patient still deciding on plastic surgery involvement, discussed option for (1) no involvement, (2) involvement for the left breast with DCIS, (3) involvement for both breasts. Will have patient follow up with Dr. Blakely for further discussion. Surgical consent obtained at LOV; surgical coordinator aware. Patient met with nurse navigator Arline in office today. Baseline sozo measurement obtained at LOV. Patient verbalized understanding and agreement of plan.

## 2024-02-21 ENCOUNTER — NON-APPOINTMENT (OUTPATIENT)
Age: 64
End: 2024-02-21

## 2024-02-22 ENCOUNTER — APPOINTMENT (OUTPATIENT)
Dept: PLASTIC SURGERY | Facility: CLINIC | Age: 64
End: 2024-02-22
Payer: MEDICARE

## 2024-02-22 VITALS — WEIGHT: 204 LBS | HEIGHT: 68 IN | BODY MASS INDEX: 30.92 KG/M2

## 2024-02-22 PROCEDURE — 99214 OFFICE O/P EST MOD 30 MIN: CPT

## 2024-02-22 NOTE — HISTORY OF PRESENT ILLNESS
[FreeTextEntry1] : 62 y/o female with newly diagnosed left DCIS referred by Dr. Rankin presents for follow up for breast reconstruction options after left lumpectomy. Patient had left breast biopsy on 01/12/2024 and left mammogram on 01/03/2024. Patient does not think she will not need radiation or chemotherapy. Patient did not have genetic test. Patient reports family history of breast cancer in 4 maternal first cousins. Denies family history of ovarian cancer. Patient is schedule for surgery on 04/04/2024 with Dr. Rankin.  No history of bleeding or clotting disorder.  PE Gen - NAD Bilateral breasts with grade III ptosis SN-N R32 L34  N-IMF R13 L14  A/P 64 yo Female with newly diagnosed left DCIS referred to re-discuss reconstruction options. At our last visit, she did not think she was interested. She is here to review again. The idea of an oncoplastic reduction on the left with a symmetrizing procedure on the right was demonstrated with images.  The wise pattern incisions were demonstrated, and the anticipated recovery reviewed. The risks were reviewed including but not limited to bleeding, infection, wound healing issues, abnormal sensation, nipple necrosis, fat necrosis, dvt and pe. The patient is interested in proceeding, scheduled on 3/15.

## 2024-03-15 ENCOUNTER — APPOINTMENT (OUTPATIENT)
Dept: BREAST CENTER | Facility: AMBULATORY SURGERY CENTER | Age: 64
End: 2024-03-15

## 2024-04-30 ENCOUNTER — APPOINTMENT (OUTPATIENT)
Dept: MAMMOGRAPHY | Facility: HOSPITAL | Age: 64
End: 2024-04-30
Payer: MEDICARE

## 2024-04-30 ENCOUNTER — OUTPATIENT (OUTPATIENT)
Dept: OUTPATIENT SERVICES | Facility: HOSPITAL | Age: 64
LOS: 1 days | End: 2024-04-30
Payer: MEDICARE

## 2024-04-30 ENCOUNTER — RESULT REVIEW (OUTPATIENT)
Age: 64
End: 2024-04-30

## 2024-04-30 DIAGNOSIS — Z98.51 TUBAL LIGATION STATUS: Chronic | ICD-10-CM

## 2024-04-30 DIAGNOSIS — M20.42 OTHER HAMMER TOE(S) (ACQUIRED), LEFT FOOT: Chronic | ICD-10-CM

## 2024-04-30 DIAGNOSIS — Z98.890 OTHER SPECIFIED POSTPROCEDURAL STATES: Chronic | ICD-10-CM

## 2024-04-30 PROCEDURE — G0279: CPT

## 2024-04-30 PROCEDURE — G0279: CPT | Mod: 26

## 2024-04-30 PROCEDURE — 77065 DX MAMMO INCL CAD UNI: CPT

## 2024-04-30 PROCEDURE — 77065 DX MAMMO INCL CAD UNI: CPT | Mod: 26,LT

## 2024-05-01 ENCOUNTER — APPOINTMENT (OUTPATIENT)
Dept: BREAST CENTER | Facility: CLINIC | Age: 64
End: 2024-05-01
Payer: MEDICARE

## 2024-05-01 VITALS
HEART RATE: 73 BPM | HEIGHT: 68 IN | WEIGHT: 185 LBS | BODY MASS INDEX: 28.04 KG/M2 | SYSTOLIC BLOOD PRESSURE: 122 MMHG | DIASTOLIC BLOOD PRESSURE: 78 MMHG

## 2024-05-01 PROCEDURE — 99215 OFFICE O/P EST HI 40 MIN: CPT

## 2024-05-02 ENCOUNTER — NON-APPOINTMENT (OUTPATIENT)
Age: 64
End: 2024-05-02

## 2024-05-02 ENCOUNTER — APPOINTMENT (OUTPATIENT)
Dept: PLASTIC SURGERY | Facility: CLINIC | Age: 64
End: 2024-05-02
Payer: MEDICARE

## 2024-05-02 VITALS — WEIGHT: 185 LBS | HEIGHT: 68 IN | BODY MASS INDEX: 28.04 KG/M2

## 2024-05-02 PROCEDURE — 99214 OFFICE O/P EST MOD 30 MIN: CPT

## 2024-05-02 NOTE — ASSESSMENT
[FreeTextEntry1] : 63 year old female presents for follow up evaluation regarding newly diagnosed L DCIS, intermediate nuclear grade and necrosis; calcifications associated with DCIS; ER neg/CT neg s/p stereotactic biopsy x2 sites (anterior and posterior) of a 10cm area of branching calcifications at L 6:00. Recommendation on concordance report for surgical or oncologic management. First seen on screening mammogram.   Patient declined genetic testing.   Patient met with plastic surgeon Dr. Blakely who discussed oncoplastic reduction on the left with a symmetrizing procedure on the right, as initial plan was for L bracketed lumpectomy.   Most recent imaging: L DX MMG 4/30/24 BIRADS-6 revealed left breast, 6:00 location, extending from the nipple posteriorly are pleomorphic branching calcifications -- total area is at least 10 cm.  Two biopsy clips (CORK and TOP HAT) represent prior biopsy sites showing DCIS. Degree of calcifications is slightly progressed since prior study. If breast conservation is planned, would recommend mammographic location of both clips and the posterior aspect of the calcifications.  Discussed patient's diagnosis in detail, reviewed images in office with patient, and answered any questions. Given extent of disease, discussed with patient recommended plan for either L skin-sparing mastectomy and L magtrace injection and TE reconstruction (discussed with Dr. Blakely) or L bracketed lumpectomy including removal of nipple areola complex and Magtrace injection with tissue rearragnement by Dr. Blakely. Pending PCP clearance. Will have patient again follow up with plastic surgeon Dr. Blakely. Surgical consent obtained for mastectomy but may change if go with lumpectomy. Surgical coordinator aware. Patient met with nurse navigvictor manuel Nunn in office today. Patient verbalized understanding and agreement of plan.

## 2024-05-02 NOTE — HISTORY OF PRESENT ILLNESS
[FreeTextEntry1] : 63 year old female presents for surgical discussion regarding L DCIS, intermediate nuclear grade and necrosis; calcifications associated with DCIS; ER neg/GA neg s/p stereotactic biopsy x2 sites (anterior and posterior) of a 10cm area of branching calcifications at L 6:00. Recommendation on concordance report for surgical or oncologic management.   First noted on abnormal mammogram -- B/L sMMG 11/22/23 BIRADS-0 at [facility] revealed questionable L IOQ calcifications. Her follow up Left DX MMG on 01/03/24 again showed L 6:00 calcifications extending from within 15 mm of the nipple, total extent approximately 10 cm, highly suspicious for DCIS, BI-RADS 4C with recommended 2 site stereotactic biopsy.   Patient met with plastic surgeon Dr. Blakely who discussed oncoplastic reduction on the left with a symmetrizing procedure on the right.  Most recent imaging: L DX MMG 4/30/24 BIRADS-6 revealed left breast, 6:00 location, extending from the nipple posteriorly are pleomorphic branching calcifications -- total area is at least 10 cm.  Two biopsy clips (CORK and TOP HAT) represent prior biopsy sites showing DCIS. Degree of calcifications is slightly progressed since prior study. If breast conservation is planned, would recommend mammographic location of both clips and the posterior aspect of the calcifications.  Of note, at initial office visit the patient reported one incidence of clear, non-spontaneous, non-bloody L nipple discharge in late Nov 2023. Patient denies palpable masses or skin changes.   Patient reports family history of breast cancer in 4 maternal first cousins - age 20's, 30's, 40's and 50's respectively. Denies famhx of ovarian cancer. Patient has 1 biological child (daughter age 46). Declined genetic testing.    Arin Lifetime Risk 8.5%  Patient reports history of HTN, acid reflux. Denies history of heart disease, DM, blood clots, sleep apnea, COPD, issues with anesthesia. Patient denies any known drug allergies.  Patient has been set up with [facility].

## 2024-05-02 NOTE — PLAN
[TextEntry] : -delma today -f/u plastic surgeon Dr. Blakely  -L nipple sparing mastectomy and L magtrace injection and reconstruction  -PCP Clearance

## 2024-05-02 NOTE — DATA REVIEWED
[FreeTextEntry1] : 11/22/23 ([facility]) B/L sMMG: heterogeneously dense. L breast questionable calcifications in lower inner and lower outer breast spanning anterior through mid depth. BI-RADS 0, incomplete FOLLOW UP: additional imaging views and possible L breast stereotactic guided core biopsy.  01/03/24 ([facility]) Left DX MMG: There are scattered areas of fibroglandular density. There are fine linear branching calcifications extending from within 15 mm of the nipple along 6:00, total extent approximately 10 cm, highly suspicious for DCIS. BI-RADS 4C, Suspicious. FOLLOW UP: Recommend 2 site stereotactic biopsy of the anterior and posterior extent.  01/12/24 ([facility]) L stereotactic biopsy x 2 sites: Site 1 - Left 6:00, 10cm area of branching calficications, posterior: DCIS, cribriform and micropapillary types, intermediate nuclear grade and marked necrosis; calcifications associated with DCIS Site 2 - Left 6:00, 10cm area of branching calcifications, anterior: DCIS, intermediate nuclear grade and necrosis; calcifications associated with DCIS. Concordant, malignant. RECOMMENDATIONL Surgical or oncologic management. ER neg/SD neg   4/30/24 ([facility]) L DX MMG: scattered areas of fbg density. Left breast, 6:00 location, extending from the nipple posteriorly are pleomorphic branching calcifications.  Total area is at least 10 cm.  Two biopsy clips (CORK and TOP HAT) represent prior biopsy sites showing DCIS. Degree of calcifications is slightly progressed since prior study. If breast conservation is planned, would recommend mammographic location of both clips and the posterior aspect of the calcifications.  RECOMMENDATION:  Surgical or oncologic management.  BI-RADS 6- Known Biopsy-Proven Malignancy

## 2024-05-02 NOTE — HISTORY OF PRESENT ILLNESS
[FreeTextEntry1] : 62 y/o female with newly diagnosed left DCIS referred by Dr. Rankin presents for follow up for breast reconstruction options after left mastectomy vs lumpectomy. Patient had left breast biopsy on 01/12/2024 and left mammogram on 01/03/2024. Patient is schedule for surgery on 05/24/2024 with Dr. Rankin.  No history of bleeding or clotting disorder.  PE Gen - NAD Bilateral breasts with grade III ptosis SN-N R32 L34  N-IMF R13 L14  A/P 62 yo Female with newly diagnosed left DCIS referred to re-discuss reconstruction options. At our last visit, she did not think she was interested. She is here to review again. The wise pattern incisions were again demonstrated, and the anticipated recovery reviewed. The risks were reviewed including but not limited to bleeding, infection, wound healing issues, abnormal sensation, nipple necrosis, fat necrosis, dvt and pe. Due to the location of the tumor, the left breast nipple would be removed.  A nipple reconstruction or tattoo once healed could be performed once healed. Her breast size would be approximately half the size of current size.   I also reviewed with Ms. DEMARCO in detail the risks, benefits, and alternatives of implant based breast reconstruction should she decide to proceed with mastectomy. Specifically, I explained to her than an implant reconstruction usually consists of two separate stages with two surgeries spaced about 4 months apart. At the time of the mastectomy, a tissue expander is placed underneath the pectoralis muscle or on top of the pectoralis muscle and is often reinforced with biologic mesh - acellular dermal matrix.  We expand the tissue expander secondarily in the office by injecting saline into the implant percutaneously until the desired size breast mound is achieved. At that point, a second stage operation is scheduled where we take her back to the operating room and remove the tissue expander and place a permanent breast implant. The permanent prosthesis can be either silicone or saline. The first stage of the reconstruction is approximately 3 hours for one breast and 4-5 hours for a bilateral procedure, with a 1-2 night hospital stay and a four-week recovery. The second stage surgery is an outpatient procedure with a two-week recovery. I reviewed with her the risks of implant reconstruction including, but not limited to, bleeding, scarring, implant infection, implant rupture, capsule contracture, implant malposition, asymmetry, contour abnormality, and need for revision surgeries. The patient is interested in proceeding with left oncoplastic breast reduction and right reduction for symmetry, scheduled on 3/15.

## 2024-05-09 ENCOUNTER — NON-APPOINTMENT (OUTPATIENT)
Age: 64
End: 2024-05-09

## 2024-05-20 ENCOUNTER — NON-APPOINTMENT (OUTPATIENT)
Age: 64
End: 2024-05-20

## 2024-05-23 ENCOUNTER — APPOINTMENT (OUTPATIENT)
Dept: NUCLEAR MEDICINE | Facility: HOSPITAL | Age: 64
End: 2024-05-23
Payer: MEDICARE

## 2024-05-23 ENCOUNTER — NON-APPOINTMENT (OUTPATIENT)
Age: 64
End: 2024-05-23

## 2024-05-23 ENCOUNTER — RESULT REVIEW (OUTPATIENT)
Age: 64
End: 2024-05-23

## 2024-05-23 ENCOUNTER — TRANSCRIPTION ENCOUNTER (OUTPATIENT)
Age: 64
End: 2024-05-23

## 2024-05-23 ENCOUNTER — APPOINTMENT (OUTPATIENT)
Dept: MAMMOGRAPHY | Facility: HOSPITAL | Age: 64
End: 2024-05-23
Payer: MEDICARE

## 2024-05-23 ENCOUNTER — OUTPATIENT (OUTPATIENT)
Dept: OUTPATIENT SERVICES | Facility: HOSPITAL | Age: 64
LOS: 1 days | End: 2024-05-23

## 2024-05-23 VITALS
DIASTOLIC BLOOD PRESSURE: 86 MMHG | WEIGHT: 185.85 LBS | RESPIRATION RATE: 16 BRPM | TEMPERATURE: 98 F | SYSTOLIC BLOOD PRESSURE: 136 MMHG | HEART RATE: 72 BPM | OXYGEN SATURATION: 100 % | HEIGHT: 68 IN

## 2024-05-23 DIAGNOSIS — M20.42 OTHER HAMMER TOE(S) (ACQUIRED), LEFT FOOT: Chronic | ICD-10-CM

## 2024-05-23 DIAGNOSIS — Z98.890 OTHER SPECIFIED POSTPROCEDURAL STATES: Chronic | ICD-10-CM

## 2024-05-23 DIAGNOSIS — Z98.51 TUBAL LIGATION STATUS: Chronic | ICD-10-CM

## 2024-05-23 PROCEDURE — 19282 PERQ DEVICE BREAST EA IMAG: CPT | Mod: 59,LT

## 2024-05-23 PROCEDURE — 19281 PERQ DEVICE BREAST 1ST IMAG: CPT | Mod: LT

## 2024-05-23 PROCEDURE — 78195 LYMPH SYSTEM IMAGING: CPT | Mod: 26,MH

## 2024-05-23 RX ORDER — PANTOPRAZOLE SODIUM 20 MG/1
1 TABLET, DELAYED RELEASE ORAL
Refills: 0 | DISCHARGE

## 2024-05-23 NOTE — PATIENT PROFILE ADULT - FALL HARM RISK - UNIVERSAL INTERVENTIONS
Bed in lowest position, wheels locked, appropriate side rails in place/Call bell, personal items and telephone in reach/Instruct patient to call for assistance before getting out of bed or chair/Non-slip footwear when patient is out of bed/[place] to call system/Physically safe environment - no spills, clutter or unnecessary equipment/Purposeful Proactive Rounding/Room/bathroom lighting operational, light cord in reach

## 2024-05-24 ENCOUNTER — RESULT REVIEW (OUTPATIENT)
Age: 64
End: 2024-05-24

## 2024-05-24 ENCOUNTER — APPOINTMENT (OUTPATIENT)
Dept: BREAST CENTER | Facility: AMBULATORY SURGERY CENTER | Age: 64
End: 2024-05-24

## 2024-05-24 ENCOUNTER — TRANSCRIPTION ENCOUNTER (OUTPATIENT)
Age: 64
End: 2024-05-24

## 2024-05-24 ENCOUNTER — INPATIENT (INPATIENT)
Facility: HOSPITAL | Age: 64
LOS: 0 days | Discharge: HOME | End: 2024-05-25
Attending: SURGERY | Admitting: SURGERY
Payer: MEDICARE

## 2024-05-24 DIAGNOSIS — M20.42 OTHER HAMMER TOE(S) (ACQUIRED), LEFT FOOT: Chronic | ICD-10-CM

## 2024-05-24 DIAGNOSIS — Z98.890 OTHER SPECIFIED POSTPROCEDURAL STATES: Chronic | ICD-10-CM

## 2024-05-24 DIAGNOSIS — Z98.51 TUBAL LIGATION STATUS: Chronic | ICD-10-CM

## 2024-05-24 PROCEDURE — 14301 TIS TRNFR ANY 30.1-60 SQ CM: CPT | Mod: LT

## 2024-05-24 PROCEDURE — 14302 TIS TRNFR ADDL 30 SQ CM: CPT | Mod: LT

## 2024-05-24 PROCEDURE — 38900 IO MAP OF SENT LYMPH NODE: CPT | Mod: LT

## 2024-05-24 PROCEDURE — 38525 BIOPSY/REMOVAL LYMPH NODES: CPT | Mod: LT

## 2024-05-24 PROCEDURE — 88360 TUMOR IMMUNOHISTOCHEM/MANUAL: CPT | Mod: 26

## 2024-05-24 PROCEDURE — 88305 TISSUE EXAM BY PATHOLOGIST: CPT | Mod: 26

## 2024-05-24 PROCEDURE — 88307 TISSUE EXAM BY PATHOLOGIST: CPT | Mod: 26

## 2024-05-24 PROCEDURE — 76098 X-RAY EXAM SURGICAL SPECIMEN: CPT | Mod: 26

## 2024-05-24 PROCEDURE — 19301 PARTIAL MASTECTOMY: CPT | Mod: LT

## 2024-05-24 PROCEDURE — 19318 BREAST REDUCTION: CPT | Mod: RT

## 2024-05-24 DEVICE — CLIP APPLIER ETHICON LIGACLIP 9 3/8" SMALL: Type: IMPLANTABLE DEVICE | Site: LEFT | Status: FUNCTIONAL

## 2024-05-24 DEVICE — CLIP APPLIER ETHICON LIGACLIP 9 3/8" MEDIUM: Type: IMPLANTABLE DEVICE | Site: LEFT | Status: FUNCTIONAL

## 2024-05-24 RX ORDER — OXYCODONE HYDROCHLORIDE 5 MG/1
10 TABLET ORAL
Refills: 0 | Status: DISCONTINUED | OUTPATIENT
Start: 2024-05-24 | End: 2024-05-25

## 2024-05-24 RX ORDER — OXYCODONE HYDROCHLORIDE 5 MG/1
5 TABLET ORAL
Refills: 0 | Status: DISCONTINUED | OUTPATIENT
Start: 2024-05-24 | End: 2024-05-25

## 2024-05-24 RX ORDER — OXYCODONE HYDROCHLORIDE 5 MG/1
2.5 TABLET ORAL
Refills: 0 | Status: DISCONTINUED | OUTPATIENT
Start: 2024-05-24 | End: 2024-05-25

## 2024-05-24 RX ORDER — ONDANSETRON 8 MG/1
4 TABLET, FILM COATED ORAL EVERY 6 HOURS
Refills: 0 | Status: DISCONTINUED | OUTPATIENT
Start: 2024-05-24 | End: 2024-05-25

## 2024-05-24 RX ORDER — AMLODIPINE BESYLATE 2.5 MG/1
1 TABLET ORAL
Refills: 0 | DISCHARGE

## 2024-05-24 RX ORDER — OXYCODONE HYDROCHLORIDE 5 MG/1
5 TABLET ORAL ONCE
Refills: 0 | Status: DISCONTINUED | OUTPATIENT
Start: 2024-05-24 | End: 2024-05-24

## 2024-05-24 RX ORDER — HEPARIN SODIUM 5000 [USP'U]/ML
5000 INJECTION INTRAVENOUS; SUBCUTANEOUS EVERY 8 HOURS
Refills: 0 | Status: DISCONTINUED | OUTPATIENT
Start: 2024-05-24 | End: 2024-05-25

## 2024-05-24 RX ORDER — AMLODIPINE BESYLATE 2.5 MG/1
5 TABLET ORAL DAILY
Refills: 0 | Status: DISCONTINUED | OUTPATIENT
Start: 2024-05-25 | End: 2024-05-25

## 2024-05-24 RX ORDER — ACETAMINOPHEN 500 MG
650 TABLET ORAL EVERY 6 HOURS
Refills: 0 | Status: DISCONTINUED | OUTPATIENT
Start: 2024-05-24 | End: 2024-05-25

## 2024-05-24 RX ADMIN — Medication 650 MILLIGRAM(S): at 16:27

## 2024-05-24 RX ADMIN — OXYCODONE HYDROCHLORIDE 5 MILLIGRAM(S): 5 TABLET ORAL at 16:43

## 2024-05-24 RX ADMIN — Medication 650 MILLIGRAM(S): at 17:35

## 2024-05-24 RX ADMIN — Medication 650 MILLIGRAM(S): at 22:46

## 2024-05-24 RX ADMIN — OXYCODONE HYDROCHLORIDE 5 MILLIGRAM(S): 5 TABLET ORAL at 17:35

## 2024-05-24 RX ADMIN — Medication 650 MILLIGRAM(S): at 23:46

## 2024-05-24 NOTE — ASU DISCHARGE PLAN (ADULT/PEDIATRIC) - ASU DC SPECIAL INSTRUCTIONSFT
- You may resume a regular diet as tolerated  - You may shower, let the water run over the dressings, but refrain from excessive scrubbing, or soaking  - Please refrain from heavy lifting >20 lbs for six weeks following discharge  - You may take Tylenol, 1 gram every 6 hours for pain. Prescriptions for additional pain medication has been sent to your pharmacy. Only take for breakthrough pain  - Please follow up with the surgeon in the office, call for an appointment with Dr. schultz

## 2024-05-24 NOTE — BRIEF OPERATIVE NOTE - NSICDXBRIEFPREOP_GEN_ALL_CORE_FT
PRE-OP DIAGNOSIS:  Ductal carcinoma in situ (DCIS) of left breast 24-May-2024 12:55:58  Morgan Shay

## 2024-05-24 NOTE — ASU DISCHARGE PLAN (ADULT/PEDIATRIC) - NS MD DC FALL RISK RISK
For information on Fall & Injury Prevention, visit: [email or web address] OR  [email or web address] OR  https://www.cdc.gov/steadi/patient.html

## 2024-05-24 NOTE — PRE-ANESTHESIA EVALUATION ADULT - NSANTHPMHFT_GEN_ALL_CORE
No CP/SOB/N/V/GERD (controlled with meds). No numbness or weakness. METS = 4. All chronic conditions stable. No CP/SOB/N/V/GERD (infrequent - controlled with meds). No numbness or weakness. METS = 4. All chronic conditions stable.

## 2024-05-24 NOTE — BRIEF OPERATIVE NOTE - NSICDXBRIEFPROCEDURE_GEN_ALL_CORE_FT
PROCEDURES:  Oncoplastic breast reduction 24-May-2024 13:39:04  Kd Jorge  
PROCEDURES:  Left breast lumpectomy 24-May-2024 12:55:24  Morgan Shay  Lumpectomy of left breast with axillary lymphadenectomy 24-May-2024 12:55:41  Morgan Shay

## 2024-05-24 NOTE — PRE-ANESTHESIA EVALUATION ADULT - NSPROPOSEDPROCEDFT_GEN_ALL_CORE
Left central localized lumpectomy and oncoplastic reduction with symmetry reduction of the right breast

## 2024-05-24 NOTE — BRIEF OPERATIVE NOTE - NSICDXBRIEFPOSTOP_GEN_ALL_CORE_FT
POST-OP DIAGNOSIS:  Ductal carcinoma in situ (DCIS) of left breast 24-May-2024 12:56:05  Morgan Shay

## 2024-05-24 NOTE — PROGRESS NOTE ADULT - ASSESSMENT
65 y/o female pmh HTN, with DCISmultifocal s/p R lumpectomy and SLNB with b/l breast reconstruction.     Patient's post operative check is within normal limits. They are tolerating diet (clears and ice chips) via mouth, they have minimal pain which is well controlled with medication, and were seen with stable vital signs and an exam appropriate for their post operative condition The patient is safe to return to the floor for further management.     Home meds: Amlo 5mg    23 hr obs  HSQ 6h post op  Reg diet (AAT)  OOBA/ISS/SQH  Pain meds on discharge  no AM labs    *****INCOMPLETE*****    63 y/o female pmh HTN, with DCISmultifocal s/p R lumpectomy and SLNB with b/l breast reconstruction.     Patient's post operative check is within normal limits. They are tolerating diet (clears and ice chips) via mouth, they have minimal pain which is well controlled with medication, and were seen with stable vital signs and an exam appropriate for their post operative condition The patient is safe to return to the floor for further management.     Home meds: Amlo 5mg    23 hr obs  HSQ 6h post op  Reg diet (AAT)  OOBA/ISS/SQH  Pain meds on discharge  no AM labs

## 2024-05-24 NOTE — PROGRESS NOTE ADULT - SUBJECTIVE AND OBJECTIVE BOX
*****INCOMPLETE*****     STATUS POST:       SUBJECTIVE: Patient seen and examined bedside by team.  They were tolerating diet (ice chips/ and sips), without nausea, vomiting, abd pain, pain was controlled on medication, and there was no fevers or chills post operatively.         Vital Signs Last 24 Hrs  T(C): 36.4 (24 May 2024 06:37), Max: 36.4 (24 May 2024 06:37)  T(F): --  HR: 72 (24 May 2024 06:37) (72 - 72)  BP: 136/86 (24 May 2024 06:37) (136/86 - 136/86)  BP(mean): --  RR: 16 (24 May 2024 06:37) (16 - 16)  SpO2: 100% (24 May 2024 06:37) (100% - 100%)      I&O's Detail      Physical Exam:  General: No acute distress, resting comfortably in bed  C/V: normal sinus rhythm  Pulm: Nonlabored breathing, no respiratory distress  Abd: soft, non-tender, non-distended, incisions clean/dry/intact. Dressings non-saturated and supportive garments in place    Extrem: warm and well perfused, no edema, SCDs in place    LABS:                RADIOLOGY & ADDITIONAL STUDIES:     STATUS POST:  R lumpectomy and SLNB with b/l breast reconstruction.        SUBJECTIVE: Patient seen and examined bedside by team.  They were tolerating diet (ice chips/ and sips), without nausea, vomiting, abd pain, pain was controlled on medication, and there was no fevers or chills post operatively.         Vital Signs Last 24 Hrs  T(C): 36.4 (24 May 2024 06:37), Max: 36.4 (24 May 2024 06:37)  T(F): --  HR: 72 (24 May 2024 06:37) (72 - 72)  BP: 136/86 (24 May 2024 06:37) (136/86 - 136/86)  BP(mean): --  RR: 16 (24 May 2024 06:37) (16 - 16)  SpO2: 100% (24 May 2024 06:37) (100% - 100%)      I&O's Detail      Physical Exam:  General: No acute distress, resting comfortably in bed  C/V: normal sinus rhythm  Pulm: Nonlabored breathing, no respiratory distress  Abd: soft, non-tender, non-distended, incisions clean/dry/intact. Dressings non-saturated and supportive garments in place    Extrem: warm and well perfused, no edema, SCDs in place    LABS:                RADIOLOGY & ADDITIONAL STUDIES:

## 2024-05-24 NOTE — BRIEF OPERATIVE NOTE - OPERATION/FINDINGS
breast reduction, oncoplastic
Lumpectomy for DCIS ([id number])    Breast mass identified via magseed localization pre-op. Mass excised through incision. Clips identified on intra-op Faxitron image. Hemostasis achieved. Incision closed primarily.

## 2024-05-25 ENCOUNTER — TRANSCRIPTION ENCOUNTER (OUTPATIENT)
Age: 64
End: 2024-05-25

## 2024-05-25 VITALS
TEMPERATURE: 98 F | HEART RATE: 60 BPM | OXYGEN SATURATION: 98 % | DIASTOLIC BLOOD PRESSURE: 80 MMHG | RESPIRATION RATE: 17 BRPM | SYSTOLIC BLOOD PRESSURE: 130 MMHG

## 2024-05-25 PROCEDURE — C1889: CPT

## 2024-05-25 PROCEDURE — 38900 IO MAP OF SENT LYMPH NODE: CPT

## 2024-05-25 PROCEDURE — 76098 X-RAY EXAM SURGICAL SPECIMEN: CPT

## 2024-05-25 PROCEDURE — A9541: CPT

## 2024-05-25 PROCEDURE — 88360 TUMOR IMMUNOHISTOCHEM/MANUAL: CPT

## 2024-05-25 PROCEDURE — 19125 EXCISION BREAST LESION: CPT

## 2024-05-25 PROCEDURE — 88307 TISSUE EXAM BY PATHOLOGIST: CPT

## 2024-05-25 PROCEDURE — 38500 BIOPSY/REMOVAL LYMPH NODES: CPT

## 2024-05-25 PROCEDURE — A4648: CPT

## 2024-05-25 PROCEDURE — 19281 PERQ DEVICE BREAST 1ST IMAG: CPT

## 2024-05-25 PROCEDURE — 78195 LYMPH SYSTEM IMAGING: CPT

## 2024-05-25 PROCEDURE — 19318 BREAST REDUCTION: CPT

## 2024-05-25 PROCEDURE — 88305 TISSUE EXAM BY PATHOLOGIST: CPT

## 2024-05-25 PROCEDURE — 14000 TIS TRNFR TRUNK 10 SQ CM/<: CPT

## 2024-05-25 PROCEDURE — 19282 PERQ DEVICE BREAST EA IMAG: CPT

## 2024-05-25 RX ADMIN — Medication 650 MILLIGRAM(S): at 08:18

## 2024-05-25 RX ADMIN — Medication 650 MILLIGRAM(S): at 07:29

## 2024-05-25 NOTE — PROGRESS NOTE ADULT - SUBJECTIVE AND OBJECTIVE BOX
Plastic Surgery    SUBJECTIVE: Pt seen and examined on rounds with team. No acute events overnight.        OBJECTIVE    PHYSICAL EXAM:   General: NAD, Lying in bed   Neuro: Awake and alert  Breasts soft, no collections jps s/s  VITALS  T(C): 36.4 (05-25-24 @ 05:07), Max: 37.1 (05-24-24 @ 17:58)  HR: 59 (05-25-24 @ 05:07) (48 - 74)  BP: 134/83 (05-25-24 @ 05:07) (100/59 - 138/87)  RR: 18 (05-25-24 @ 05:07) (16 - 18)  SpO2: 98% (05-25-24 @ 05:07) (96% - 100%)  CAPILLARY BLOOD GLUCOSE          Is/Os    05-24 @ 07:01  -  05-25 @ 07:00  --------------------------------------------------------  IN:    Oral Fluid: 680 mL  Total IN: 680 mL    OUT:    Bulb (mL): 71 mL    Bulb (mL): 40.5 mL    Voided (mL): 800 mL  Total OUT: 911.5 mL    Total NET: -231.5 mL      05-25 @ 07:01  -  05-25 @ 09:30  --------------------------------------------------------  IN:  Total IN: 0 mL    OUT:    Voided (mL): 400 mL  Total OUT: 400 mL    Total NET: -400 mL          MEDICATIONS (STANDING): amLODIPine   Tablet 5 milliGRAM(s) Oral daily  heparin   Injectable 5000 Unit(s) SubCutaneous every 8 hours    MEDICATIONS (PRN):acetaminophen     Tablet .. 650 milliGRAM(s) Oral every 6 hours PRN Temp greater or equal to 38C (100.4F), Mild Pain (1 - 3), Moderate Pain (4 - 6)  ondansetron Injectable 4 milliGRAM(s) IV Push every 6 hours PRN Nausea  oxyCODONE    IR 5 milliGRAM(s) Oral four times a day PRN Severe Pain (7 - 10)  oxyCODONE    IR 2.5 milliGRAM(s) Oral four times a day PRN Moderate Pain (4 - 6)  oxyCODONE    IR 10 milliGRAM(s) Oral two times a day PRN breakthrough pain      LABS                  IMAGING STUDIES

## 2024-05-25 NOTE — DISCHARGE NOTE NURSING/CASE MANAGEMENT/SOCIAL WORK - PATIENT PORTAL LINK FT
You can access the FollowMyHealth Patient Portal offered by [facility] by registering at the following website: [email or web address]. By joining [employer]’s FollowMyHealth portal, you will also be able to view your health information using other applications (apps) compatible with our system.

## 2024-05-25 NOTE — PROGRESS NOTE ADULT - ASSESSMENT
63 y/o female pmh HTN, with DCISmultifocal s/p R lumpectomy and SLNB with b/l breast reconstruction.     Home meds: Amlo 5mg    23 hr obs  HSQ 6h post op  Reg diet (AAT)  OOBA/ISS/SQH  Pain meds on discharge  no AM labs

## 2024-05-25 NOTE — PROGRESS NOTE ADULT - ASSESSMENT
s/p oncoplastic reduction and right BR    - keep dressings on   - f/u with Dr. Blakely  - has pain meds   - shreya drain care

## 2024-05-25 NOTE — PROGRESS NOTE ADULT - SUBJECTIVE AND OBJECTIVE BOX
STATUS POST:       SUBJECTIVE: Patient seen and examined bedside by chief resident. HUSSEIN, patient tolerating diet without nausea or vomiting. No fevers, chills, or constitutional symptoms. Endorses bowel movements and flatus. Pain well controlled on PO pain meds.     amLODIPine   Tablet 5 milliGRAM(s) Oral daily  heparin   Injectable 5000 Unit(s) SubCutaneous every 8 hours      Vital Signs Last 24 Hrs  T(C): 36.5 (25 May 2024 10:00), Max: 37.1 (24 May 2024 17:58)  T(F): 97.7 (25 May 2024 10:00), Max: 98.7 (24 May 2024 17:58)  HR: 60 (25 May 2024 10:00) (48 - 74)  BP: 130/80 (25 May 2024 10:00) (100/59 - 138/87)  BP(mean): 82 (24 May 2024 17:10) (74 - 82)  RR: 17 (25 May 2024 10:00) (16 - 18)  SpO2: 98% (25 May 2024 10:00) (96% - 100%)    Parameters below as of 25 May 2024 10:00  Patient On (Oxygen Delivery Method): room air      I&O's Detail    24 May 2024 07:01  -  25 May 2024 07:00  --------------------------------------------------------  IN:    Oral Fluid: 680 mL  Total IN: 680 mL    OUT:    Bulb (mL): 71 mL    Bulb (mL): 40.5 mL    Voided (mL): 800 mL  Total OUT: 911.5 mL    Total NET: -231.5 mL      25 May 2024 07:01  -  25 May 2024 12:41  --------------------------------------------------------  IN:  Total IN: 0 mL    OUT:    Voided (mL): 400 mL  Total OUT: 400 mL    Total NET: -400 mL          Physical Exam:  General: No acute distress, resting comfortably in bed  C/V: normal sinus rhythm  Chest: incisions clean/dry/intact. ROZ SS x 2. Supportive dressing in place   Pulm: Nonlabored breathing, no respiratory distress  Abd: soft, non-tender, non-distended,   Extrem: warm and well perfused, no edema, SCDs in place    LABS:                RADIOLOGY & ADDITIONAL STUDIES:

## 2024-05-25 NOTE — DISCHARGE NOTE NURSING/CASE MANAGEMENT/SOCIAL WORK - NSDCPEFALRISK_GEN_ALL_CORE
For information on Fall & Injury Prevention, visit: [email or web address] OR  [email or web address] OR  https://www.cdc.gov/steadi/patient.html

## 2024-05-29 LAB — SURGICAL PATHOLOGY STUDY: SIGNIFICANT CHANGE UP

## 2024-05-30 ENCOUNTER — APPOINTMENT (OUTPATIENT)
Dept: PLASTIC SURGERY | Facility: CLINIC | Age: 64
End: 2024-05-30
Payer: MEDICARE

## 2024-05-30 PROBLEM — Z80.3 FAMILY HISTORY OF MALIGNANT NEOPLASM OF BREAST: Status: ACTIVE | Noted: 2024-01-03

## 2024-05-30 PROBLEM — R22.1 LOCALIZED SWELLING, MASS AND LUMP, NECK: Chronic | Status: ACTIVE | Noted: 2024-05-23

## 2024-05-30 PROCEDURE — 99024 POSTOP FOLLOW-UP VISIT: CPT

## 2024-05-30 RX ORDER — OXYCODONE AND ACETAMINOPHEN 5; 325 MG/1; MG/1
5-325 TABLET ORAL
Qty: 20 | Refills: 0 | Status: DISCONTINUED | COMMUNITY
Start: 2024-05-15 | End: 2024-05-30

## 2024-05-30 NOTE — SURGICAL HISTORY
[de-identified] : 05/24/2024: left breast oncoplastic reduction and right symmetry reduction with Dr. Rankin

## 2024-05-30 NOTE — HISTORY OF PRESENT ILLNESS
[FreeTextEntry1] : 63 y/o female presents 6 days s/p left breast oncoplastic reduction and right symmetry reduction with Dr. Rankin. Denies any f/c/n/v. Patient is taking Tylenol prn. Patient has 2 ROZ drains.  PE: Incisions c/d/i, no collections or s/sx of infection, left nipple surgically absent, bilateral ROZ drains removed Healing well Shower normally No strenuous activity Wear sports bra RTC in 3 weeks

## 2024-06-05 ENCOUNTER — APPOINTMENT (OUTPATIENT)
Dept: BREAST CENTER | Facility: CLINIC | Age: 64
End: 2024-06-05
Payer: MEDICARE

## 2024-06-05 VITALS
SYSTOLIC BLOOD PRESSURE: 117 MMHG | HEIGHT: 68 IN | HEART RATE: 81 BPM | DIASTOLIC BLOOD PRESSURE: 77 MMHG | BODY MASS INDEX: 28.04 KG/M2 | WEIGHT: 185 LBS

## 2024-06-05 DIAGNOSIS — I10 ESSENTIAL (PRIMARY) HYPERTENSION: ICD-10-CM

## 2024-06-05 DIAGNOSIS — D05.92 UNSPECIFIED TYPE OF CARCINOMA IN SITU OF LEFT BREAST: ICD-10-CM

## 2024-06-05 DIAGNOSIS — Z80.3 FAMILY HISTORY OF MALIGNANT NEOPLASM OF BREAST: ICD-10-CM

## 2024-06-05 DIAGNOSIS — K21.9 GASTRO-ESOPHAGEAL REFLUX DISEASE WITHOUT ESOPHAGITIS: ICD-10-CM

## 2024-06-05 PROCEDURE — 99024 POSTOP FOLLOW-UP VISIT: CPT

## 2024-06-10 NOTE — HISTORY OF PRESENT ILLNESS
[FreeTextEntry1] : 64 year old female presents for post-op evaluation s/p Left central localized lumpectomy + L SLNB with right symmetry reduction (Dr. Blakely) on 5/24/24 of Left DCIS, intermediate nuclear grade and necrosis with associated calcifications, ER neg, [place] neg; initially seen on screening mammo with subsequent dx imaging and proven on stereo bx x2 sites (anterior and posterior) of L 6:00 1cmfn 10cm branching calcifications.  Final surgical pathology yielded 2.0cm DCIS, flat and micropapillary patterns with high nuclear grade and central necrosis, ER- [place]-; two biopsy sites identified, calcs associated with DCIS, and benign nipple/skin; margins clear. LN neg (0/2).  Denies any fever, chills, redness, pain, or discharge at the incision site.  Patient had initial post-op evaluation with plastic surgeon, Dr. Blakely, on 5/30/24 and drains were removed.  Of note, at initial office visit the patient reported one incidence of clear, non-spontaneous, non-bloody L nipple discharge in late Nov 2023. Patient denies palpable masses or skin changes.   Patient reports family history of breast cancer in 4 maternal first cousins - age 20's, 30's, 40's and 50's respectively. Denies famhx of ovarian cancer. Patient has 1 biological child (daughter age 46). Declined genetic testing.   Patient reports history of HTN, acid reflux. Denies history of heart disease, DM, blood clots, sleep apnea, COPD, issues with anesthesia. Patient denies any known drug allergies.  Patient has been set up with [facility].

## 2024-06-10 NOTE — PLAN
[TextEntry] : - f/u plastic surgeon Dr. Blakely as scheduled  - rad onc - med onc - RTC in Sept 2024 (+sozo)

## 2024-06-10 NOTE — DATA REVIEWED
[FreeTextEntry1] : 11/22/23 ([facility]) B/L sMMG: heterogeneously dense. L breast questionable calcifications in lower inner and lower outer breast spanning anterior through mid depth. BI-RADS 0, incomplete FOLLOW UP: additional imaging views and possible L breast stereotactic guided core biopsy.  01/03/24 ([facility]) Left DX MMG: There are scattered areas of fibroglandular density. There are fine linear branching calcifications extending from within 15 mm of the nipple along 6:00, total extent approximately 10 cm, highly suspicious for DCIS. BI-RADS 4C, Suspicious. FOLLOW UP: Recommend 2 site stereotactic biopsy of the anterior and posterior extent.  01/12/24 ([facility]) L stereotactic biopsy x 2 sites: Site 1 - Left 6:00, 10cm area of branching calficications, posterior: DCIS, cribriform and micropapillary types, intermediate nuclear grade and marked necrosis; calcifications associated with DCIS Site 2 - Left 6:00, 10cm area of branching calcifications, anterior: DCIS, intermediate nuclear grade and necrosis; calcifications associated with DCIS. Concordant, malignant. RECOMMENDATIONL Surgical or oncologic management. ER neg/[place] neg   5/24/24 ([facility]) Final surgical pathology: 2.0cm DCIS, flat and micropapillary patterns with high nuclear grade and central necrosis, ER- [place]-; two biopsy sites identified, calcs associated with DCIS, and benign nipple/skin; margins clear. Lymph nodes negative (0/2).

## 2024-06-10 NOTE — PHYSICAL EXAM
[Supple] : supple [No Supraclavicular Adenopathy] : no supraclavicular adenopathy [Examined in the supine and seated position] : examined in the supine and seated position [No dominant masses] : no dominant masses in right breast  [No dominant masses] : no dominant masses left breast [No Nipple Retraction] : no left nipple retraction [No Nipple Discharge] : no left nipple discharge [No Axillary Lymphadenopathy] : no left axillary lymphadenopathy [de-identified] : incisions c/d/i [de-identified] : acquired absence of L nipple

## 2024-06-10 NOTE — ASSESSMENT
[FreeTextEntry1] : 64 year old female presents for post-op evaluation s/p Left central localized lumpectomy + L SLNB on 5/24/24 of Left DCIS, intermediate nuclear grade and necrosis with associated calcifications, ER neg, [place] neg; initially seen on screening mammo with subsequent dx imaging and proven on stereo bx x2 sites (anterior and posterior) of L 6:00 1cmfn 10cm branching calcifications.  Final surgical pathology yielded 2.0cm DCIS, flat and micropapillary patterns with high nuclear grade and central necrosis, ER- [place]-; two biopsy sites identified, calcs associated with DCIS, and benign nipple/skin; margins clear. LN neg (0/2).  Patient declined genetic testing.   Patient is doing well post-operatively. Suture ends were cut and steri-strips were reapplied. Patient had initial post-op exam with Dr. Blakely; drains were removed (5/30/24). Advised continued follow up with Dr. Blakely as scheduled. Discussed patients pathology in detail and answered all patient's questions. Patient to see radiation oncologist to discuss results and plan whether to proceed with radiation. Patient met with nurse navigator Arline in office today. Patient to also see medical oncologist. Patient is to return in 3 months for reexamination (and repeat sozo measurement). Patient verbalizes understanding and is in agreement with the plan.

## 2024-06-18 ENCOUNTER — APPOINTMENT (OUTPATIENT)
Dept: RADIATION ONCOLOGY | Facility: CLINIC | Age: 64
End: 2024-06-18
Payer: MEDICARE

## 2024-06-18 VITALS
BODY MASS INDEX: 27.79 KG/M2 | DIASTOLIC BLOOD PRESSURE: 78 MMHG | WEIGHT: 182.8 LBS | SYSTOLIC BLOOD PRESSURE: 129 MMHG | OXYGEN SATURATION: 100 % | HEART RATE: 67 BPM | TEMPERATURE: 97.7 F

## 2024-06-18 PROCEDURE — 99205 OFFICE O/P NEW HI 60 MIN: CPT

## 2024-06-18 RX ORDER — OMEPRAZOLE MAGNESIUM, AMOXICILLIN AND RIFABUTIN 10; 250; 12.5 MG/1; MG/1; MG/1
250-12.5-1 CAPSULE, DELAYED RELEASE ORAL
Qty: 168 | Refills: 0 | Status: COMPLETED | COMMUNITY
Start: 2023-11-07 | End: 2024-06-18

## 2024-06-18 RX ORDER — PANTOPRAZOLE SODIUM 40 MG/1
40 TABLET, DELAYED RELEASE ORAL
Refills: 0 | Status: COMPLETED | COMMUNITY
End: 2024-06-18

## 2024-06-21 NOTE — VITALS
[Date: ____________] : Patient's last distress assessment performed on [unfilled]. [Maximal Pain Intensity: 0/10] : 0/10 [Least Pain Intensity: 0/10] : 0/10 [90: Able to carry normal activity; minor signs or symptoms of disease.] : 90: Able to carry normal activity; minor signs or symptoms of disease.  [ECOG Performance Status: 1 - Restricted in physically strenuous activity but ambulatory and able to carry out work of a light or sedentary nature] : Performance Status: 1 - Restricted in physically strenuous activity but ambulatory and able to carry out work of a light or sedentary nature, e.g., light house work, office work [4 - Distress Level] : Distress Level: 4

## 2024-06-21 NOTE — PHYSICAL EXAM
[Normal] : normoactive bowel sounds, soft and nontender, no hepatosplenomegaly or masses appreciated [de-identified] : Nipple removed on L, incision well healed. No abnormal masses

## 2024-06-21 NOTE — HISTORY OF PRESENT ILLNESS
[FreeTextEntry1] : Pilar Hylton is a 63 yo F with AJCC 8th Ed Stage 0 (pTisN0) LEFT lower central Breast ER-/CT- breast DCIS s/p 5/24/24 Left central loc lumpectomy and Left SLNBx (20mm, grade III, no LVI, margins negative, distance from DCIS to the closest margin is greater than 2mm, 0/3 nodes positive, oncotype pending) who is referred by Dr. Rankin for discussion of radiation to the Left breast.    6/18/24: Consultation  Mrs. Hylton presents for discussion of adjuvant radiation. No history of prior radiation, PPM/ICD, or connective tissue disorders. She lives with her  and is independent in ADLs. She has an appointment with Dr. Prasad to discuss systemic tx.   History of Present Illness  ______________________________  Pilar Hylton is a 63 yo F with pmh HTN who was found to have Left breast DCIS in setting of abnormal screening mammogram   11/22/23 ([facility]) B/L sMMG: heterogeneously dense. L breast questionable calcifications in lower inner and lower outer breast spanning anterior through mid depth. BI-RADS 0, incomplete FOLLOW UP: additional imaging views and possible L breast stereotactic guided core biopsy.   01/03/24 ([facility]) Left DX MMG: There are scattered areas of fibroglandular density. There are fine linear branching calcifications extending from within 15 mm of the nipple along 6:00, total extent approximately 10 cm, highly suspicious for DCIS. BI-RADS 4C, Suspicious. FOLLOW UP: Recommend 2 site stereotactic biopsy of the anterior and posterior extent.   01/12/24 ([facility]) L stereotactic biopsy x 2 sites:  Site 1 - Left 6:00, 10cm area of branching calficications, posterior: DCIS, cribriform and micropapillary types, intermediate nuclear grade and marked necrosis; calcifications associated with DCIS  Site 2 - Left 6:00, 10cm area of branching calcifications, anterior: DCIS, intermediate nuclear grade and necrosis; calcifications associated with DCIS.  Concordant, malignant. RECOMMENDATIONL Surgical or oncologic management. ER neg/CT neg   5/24/24 ([facility]) Final surgical pathology: 2.0cm DCIS, flat and micropapillary patterns with high nuclear grade and central necrosis, ER- CT-; two biopsy sites identified, calcs associated with DCIS, and benign nipple/skin; margins clear. Lymph nodes negative (0/2).

## 2024-06-27 ENCOUNTER — APPOINTMENT (OUTPATIENT)
Dept: PLASTIC SURGERY | Facility: CLINIC | Age: 64
End: 2024-06-27
Payer: MEDICARE

## 2024-06-27 DIAGNOSIS — D05.12 INTRADUCTAL CARCINOMA IN SITU OF LEFT BREAST: ICD-10-CM

## 2024-06-27 PROCEDURE — 99024 POSTOP FOLLOW-UP VISIT: CPT

## 2024-07-15 ENCOUNTER — APPOINTMENT (OUTPATIENT)
Dept: OTOLARYNGOLOGY | Facility: CLINIC | Age: 64
End: 2024-07-15
Payer: MEDICARE

## 2024-07-15 ENCOUNTER — APPOINTMENT (OUTPATIENT)
Dept: HEMATOLOGY ONCOLOGY | Facility: CLINIC | Age: 64
End: 2024-07-15
Payer: MEDICARE

## 2024-07-15 VITALS
TEMPERATURE: 96.8 F | HEIGHT: 68 IN | WEIGHT: 185 LBS | BODY MASS INDEX: 28.04 KG/M2 | SYSTOLIC BLOOD PRESSURE: 138 MMHG | DIASTOLIC BLOOD PRESSURE: 89 MMHG | HEART RATE: 66 BPM

## 2024-07-15 VITALS
DIASTOLIC BLOOD PRESSURE: 84 MMHG | WEIGHT: 186 LBS | BODY MASS INDEX: 28.19 KG/M2 | HEART RATE: 61 BPM | RESPIRATION RATE: 18 BRPM | HEIGHT: 68 IN | TEMPERATURE: 98.2 F | SYSTOLIC BLOOD PRESSURE: 124 MMHG

## 2024-07-15 DIAGNOSIS — D05.12 INTRADUCTAL CARCINOMA IN SITU OF LEFT BREAST: ICD-10-CM

## 2024-07-15 PROCEDURE — 31575 DIAGNOSTIC LARYNGOSCOPY: CPT

## 2024-07-15 PROCEDURE — 99203 OFFICE O/P NEW LOW 30 MIN: CPT

## 2024-07-15 PROCEDURE — 99214 OFFICE O/P EST MOD 30 MIN: CPT | Mod: 25

## 2024-07-28 ENCOUNTER — NON-APPOINTMENT (OUTPATIENT)
Age: 64
End: 2024-07-28

## 2024-07-31 ENCOUNTER — NON-APPOINTMENT (OUTPATIENT)
Age: 64
End: 2024-07-31

## 2024-07-31 RX ORDER — MOMETASONE FUROATE 1 MG/G
0.1 CREAM TOPICAL
Qty: 1 | Refills: 1 | Status: ACTIVE | COMMUNITY
Start: 2024-07-31 | End: 1900-01-01

## 2024-07-31 NOTE — REVIEW OF SYSTEMS
[Fatigue: Grade 0] : Fatigue: Grade 0 [Breast Pain: Grade 1 - Mild pain] : Breast Pain: Grade 1 - Mild pain [Skin Hyperpigmentation: Grade 0] : Skin Hyperpigmentation: Grade 0 [Dermatitis Radiation: Grade 0] : Dermatitis Radiation: Grade 0

## 2024-07-31 NOTE — DISEASE MANAGEMENT
[Pathological] : TNM Stage: p [0] : 0 [TTNM] : is [NTNM] : 0 [MTNM] : X [de-identified] : 530cGy [de-identified] : 5240cGy [de-identified] : Left breast + boost

## 2024-07-31 NOTE — HISTORY OF PRESENT ILLNESS
[FreeTextEntry1] : Pilar Hylton is a 65 yo F with AJCC 8th Ed Stage 0 (pTisN0) LEFT lower central Breast ER-/UT- breast DCIS s/p 5/24/24 Left central loc lumpectomy and Left SLNBx (20mm, grade III, no LVI, margins negative, distance from DCIS to the closest margin is greater than 2mm, 0/3 nodes positive, oncotype pending) who is referred by Dr. Rankin for discussion of radiation to the Left breast.    7/31/24: OTV 530cGy/4240cGy, 2/16fx to the Left breast, 0/1000cGy boost. Ms. Hylton reports feeling well. Appetite is good. Denies fatigue. Skin is good. Script sent for mometasone, and she was instructed in use. ROM is good. Plan to continue radiation.   6/18/24: Consultation  Mrs. Hylton presents for discussion of adjuvant radiation. No history of prior radiation, PPM/ICD, or connective tissue disorders. She lives with her  and is independent in ADLs. She has an appointment with Dr. Prasad to discuss systemic tx.   History of Present Illness  ______________________________  Pilar Hylton is a 65 yo F with pmh HTN who was found to have Left breast DCIS in setting of abnormal screening mammogram   11/22/23 ([facility]) B/L sMMG: heterogeneously dense. L breast questionable calcifications in lower inner and lower outer breast spanning anterior through mid depth. BI-RADS 0, incomplete FOLLOW UP: additional imaging views and possible L breast stereotactic guided core biopsy.   01/03/24 ([facility]) Left DX MMG: There are scattered areas of fibroglandular density. There are fine linear branching calcifications extending from within 15 mm of the nipple along 6:00, total extent approximately 10 cm, highly suspicious for DCIS. BI-RADS 4C, Suspicious. FOLLOW UP: Recommend 2 site stereotactic biopsy of the anterior and posterior extent.   01/12/24 ([facility]) L stereotactic biopsy x 2 sites:  Site 1 - Left 6:00, 10cm area of branching calficications, posterior: DCIS, cribriform and micropapillary types, intermediate nuclear grade and marked necrosis; calcifications associated with DCIS  Site 2 - Left 6:00, 10cm area of branching calcifications, anterior: DCIS, intermediate nuclear grade and necrosis; calcifications associated with DCIS.  Concordant, malignant. RECOMMENDATIONL Surgical or oncologic management. ER neg/[place] neg   5/24/24 ([facility]) Final surgical pathology: 2.0cm DCIS, flat and micropapillary patterns with high nuclear grade and central necrosis, ER- [place]-; two biopsy sites identified, calcs associated with DCIS, and benign nipple/skin; margins clear. Lymph nodes negative (0/2).

## 2024-07-31 NOTE — DISEASE MANAGEMENT
[Pathological] : TNM Stage: p [0] : 0 [TTNM] : is [NTNM] : 0 [MTNM] : X [de-identified] : 530cGy [de-identified] : 5240cGy [de-identified] : Left breast + boost

## 2024-07-31 NOTE — HISTORY OF PRESENT ILLNESS
[FreeTextEntry1] : Pilar Hylton is a 63 yo F with AJCC 8th Ed Stage 0 (pTisN0) LEFT lower central Breast ER-/CT- breast DCIS s/p 5/24/24 Left central loc lumpectomy and Left SLNBx (20mm, grade III, no LVI, margins negative, distance from DCIS to the closest margin is greater than 2mm, 0/3 nodes positive, oncotype pending) who is referred by Dr. Rankin for discussion of radiation to the Left breast.    7/31/24: OTV 530cGy/4240cGy, 2/16fx to the Left breast, 0/1000cGy boost. Ms. Hyltno reports feeling well. Appetite is good. Denies fatigue. Skin is good. Script sent for mometasone, and she was instructed in use. ROM is good. Plan to continue radiation.   6/18/24: Consultation  Mrs. Hylton presents for discussion of adjuvant radiation. No history of prior radiation, PPM/ICD, or connective tissue disorders. She lives with her  and is independent in ADLs. She has an appointment with Dr. Prasad to discuss systemic tx.   History of Present Illness  ______________________________  Pilar Hylton is a 63 yo F with pmh HTN who was found to have Left breast DCIS in setting of abnormal screening mammogram   11/22/23 ([facility]) B/L sMMG: heterogeneously dense. L breast questionable calcifications in lower inner and lower outer breast spanning anterior through mid depth. BI-RADS 0, incomplete FOLLOW UP: additional imaging views and possible L breast stereotactic guided core biopsy.   01/03/24 ([facility]) Left DX MMG: There are scattered areas of fibroglandular density. There are fine linear branching calcifications extending from within 15 mm of the nipple along 6:00, total extent approximately 10 cm, highly suspicious for DCIS. BI-RADS 4C, Suspicious. FOLLOW UP: Recommend 2 site stereotactic biopsy of the anterior and posterior extent.   01/12/24 ([facility]) L stereotactic biopsy x 2 sites:  Site 1 - Left 6:00, 10cm area of branching calficications, posterior: DCIS, cribriform and micropapillary types, intermediate nuclear grade and marked necrosis; calcifications associated with DCIS  Site 2 - Left 6:00, 10cm area of branching calcifications, anterior: DCIS, intermediate nuclear grade and necrosis; calcifications associated with DCIS.  Concordant, malignant. RECOMMENDATIONL Surgical or oncologic management. ER neg/CT neg   5/24/24 ([facility]) Final surgical pathology: 2.0cm DCIS, flat and micropapillary patterns with high nuclear grade and central necrosis, ER- CT-; two biopsy sites identified, calcs associated with DCIS, and benign nipple/skin; margins clear. Lymph nodes negative (0/2).

## 2024-08-01 ENCOUNTER — NON-APPOINTMENT (OUTPATIENT)
Age: 64
End: 2024-08-01

## 2024-08-06 ENCOUNTER — NON-APPOINTMENT (OUTPATIENT)
Age: 64
End: 2024-08-06

## 2024-08-06 NOTE — HISTORY OF PRESENT ILLNESS
Patient in clinic today per  received testosterone IM in right gluteal. 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 Rankin for discussion of radiation to the Left breast.    8/6/24: OTV 1325cGy/4200cGy, 5/16fx to the Left breast, 0/1000cGy boost. Ms. Hylton reports feeling well. Appetite is good. She denies fatigue. Skin is good. She is using mometasone as directed. ROM is good. Plan to continue radiation.   7/31/24: OTV 530cGy/4240cGy, 2/16fx to the Left breast, 0/1000cGy boost. Ms. Hylton reports feeling well. Appetite is good. Denies fatigue. Skin is good. Script sent for mometasone, and she was instructed in use. ROM is good. Plan to continue radiation.   6/18/24: Consultation  Mrs. Hylton presents for discussion of adjuvant radiation. No history of prior radiation, PPM/ICD, or connective tissue disorders. She lives with her  and is independent in ADLs. She has an appointment with Dr. Prasad to discuss systemic tx.   History of Present Illness  ______________________________  Pilar Hylton is a 65 yo F with pmh HTN who was found to have Left breast DCIS in setting of abnormal screening mammogram   11/22/23 ([facility]) B/L sMMG: heterogeneously dense. L breast questionable calcifications in lower inner and lower outer breast spanning anterior through mid depth. BI-RADS 0, incomplete FOLLOW UP: additional imaging views and possible L breast stereotactic guided core biopsy.   01/03/24 ([facility]) Left DX MMG: There are scattered areas of fibroglandular density. There are fine linear branching calcifications extending from within 15 mm of the nipple along 6:00, total extent approximately 10 cm, highly suspicious for DCIS. BI-RADS 4C, Suspicious. FOLLOW UP: Recommend 2 site stereotactic biopsy of the anterior and posterior extent.   01/12/24 ([facility]) L stereotactic biopsy x 2 sites:  Site 1 - Left 6:00, 10cm area of branching calficications, posterior: DCIS, cribriform and micropapillary types, intermediate nuclear grade and marked necrosis; calcifications associated with DCIS  Site 2 - Left 6:00, 10cm area of branching calcifications, anterior: DCIS, intermediate nuclear grade and necrosis; calcifications associated with DCIS.  Concordant, malignant. RECOMMENDATIONL Surgical or oncologic management. ER neg/SD neg   5/24/24 ([facility]) Final surgical pathology: 2.0cm DCIS, flat and micropapillary patterns with high nuclear grade and central necrosis, ER- SD-; two biopsy sites identified, calcs associated with DCIS, and benign nipple/skin; margins clear. Lymph nodes negative (0/2).

## 2024-08-06 NOTE — DISEASE MANAGEMENT
[TTNM] : is [NTNM] : 0 [MTNM] : X [de-identified] : [id number] [de-identified] : 5240cGy [de-identified] : Left breast + boost

## 2024-08-14 ENCOUNTER — NON-APPOINTMENT (OUTPATIENT)
Age: 64
End: 2024-08-14

## 2024-08-14 NOTE — REVIEW OF SYSTEMS
[Fatigue: Grade 0] : Fatigue: Grade 0 [Breast Pain: Grade 1 - Mild pain] : Breast Pain: Grade 1 - Mild pain [Skin Hyperpigmentation: Grade 0] : Skin Hyperpigmentation: Grade 0 [Dermatitis Radiation: Grade 0] : Dermatitis Radiation: Grade 0 [Fatigue: Grade 1 - Fatigue relieved by rest] : Fatigue: Grade 1 - Fatigue relieved by rest [Breast Pain: Grade 0] : Breast Pain: Grade 0 [Skin Hyperpigmentation: Grade 1 - Hyperpigmentation covering <10% BSA; no psychosocial impact] : Skin Hyperpigmentation: Grade 1 - Hyperpigmentation covering <10% BSA; no psychosocial impact

## 2024-08-15 ENCOUNTER — APPOINTMENT (OUTPATIENT)
Dept: PLASTIC SURGERY | Facility: CLINIC | Age: 64
End: 2024-08-15
Payer: MEDICARE

## 2024-08-15 DIAGNOSIS — D05.12 INTRADUCTAL CARCINOMA IN SITU OF LEFT BREAST: ICD-10-CM

## 2024-08-15 PROCEDURE — 99024 POSTOP FOLLOW-UP VISIT: CPT

## 2024-08-15 NOTE — SURGICAL HISTORY
[de-identified] : 05/24/2024: left breast oncoplastic reduction and right symmetry reduction with Dr. Rankin

## 2024-08-15 NOTE — HISTORY OF PRESENT ILLNESS
[FreeTextEntry1] : 63 y/o female presents 3 months s/p left breast oncoplastic reduction and right symmetry reduction with Dr. Rankin 05/24/2024. Denies any f/c/n/v. Patient is has no concerns. Patient started radiation on 08/06/2024, her last session is on 08/28/2024.  PE: Incisions well healed, no collections or s/sx of infection, left nipple surgically absent No restrictions Massage to left upper pole  We can proceed with Nipple reconstruction after the completion of RT and any revisions if the patient wishes. At this point she reports being happy with the results and may not have any interest in proceeding. We will explore that on follow up. RTC in February 2025 or PRN.

## 2024-08-20 ENCOUNTER — NON-APPOINTMENT (OUTPATIENT)
Age: 64
End: 2024-08-20

## 2024-08-21 ENCOUNTER — NON-APPOINTMENT (OUTPATIENT)
Age: 64
End: 2024-08-21

## 2024-08-21 NOTE — HISTORY OF PRESENT ILLNESS
[FreeTextEntry1] : Pilar Hylton is a 63 yo F with AJCC 8th Ed Stage 0 (pTisN0) LEFT lower central Breast ER-/AK- breast DCIS s/p 5/24/24 Left central loc lumpectomy and Left SLNBx (20mm, grade III, no LVI, margins negative, distance from DCIS to the closest margin is greater than 2mm, 0/3 nodes positive, oncotype pending) who is referred by Dr. Rankin for discussion of radiation to the Left breast.     8/21/2024 OTV 3710/4240 cGy, 14/16 Fx to the left breast. She came to clinic prior to today RT treatment. Overall, she is tolerating her therapy Left breast skin has mild hyperpigmentation. She continues to use mometasone cream as directed. She will continue planned treatments.  8/14/24: OTV 2650cGy/4240cGy, 10/16fx to the Left breast. Mrs. Hylton reports feeling well. She has mild fatigue which is relieved by rest. Appetite is good. Skin is good with mild hyperpigmentation. She is using mometasone as directed. ROM is good. Plan to continue radiation.   8/6/24: OTV 1325cGy/4200cGy, 5/16fx to the Left breast, 0/1000cGy boost. Ms. Hylton reports feeling well. Appetite is good. She denies fatigue. Skin is good. She is using mometasone as directed. ROM is good. Plan to continue radiation.   7/31/24: OTV 530cGy/4240cGy, 2/16fx to the Left breast, 0/1000cGy boost. Ms. Hylton reports feeling well. Appetite is good. Denies fatigue. Skin is good. Script sent for mometasone, and she was instructed in use. ROM is good. Plan to continue radiation.   6/18/24: Consultation  Mrs. Hylton presents for discussion of adjuvant radiation. No history of prior radiation, PPM/ICD, or connective tissue disorders. She lives with her  and is independent in ADLs. She has an appointment with Dr. Prasad to discuss systemic tx.   History of Present Illness  ______________________________  Pilar Hylton is a 63 yo F with pmh HTN who was found to have Left breast DCIS in setting of abnormal screening mammogram   11/22/23 ([facility]) B/L sMMG: heterogeneously dense. L breast questionable calcifications in lower inner and lower outer breast spanning anterior through mid depth. BI-RADS 0, incomplete FOLLOW UP: additional imaging views and possible L breast stereotactic guided core biopsy.   01/03/24 ([facility]) Left DX MMG: There are scattered areas of fibroglandular density. There are fine linear branching calcifications extending from within 15 mm of the nipple along 6:00, total extent approximately 10 cm, highly suspicious for DCIS. BI-RADS 4C, Suspicious. FOLLOW UP: Recommend 2 site stereotactic biopsy of the anterior and posterior extent.   01/12/24 ([facility]) L stereotactic biopsy x 2 sites:  Site 1 - Left 6:00, 10cm area of branching calficications, posterior: DCIS, cribriform and micropapillary types, intermediate nuclear grade and marked necrosis; calcifications associated with DCIS  Site 2 - Left 6:00, 10cm area of branching calcifications, anterior: DCIS, intermediate nuclear grade and necrosis; calcifications associated with DCIS.  Concordant, malignant. RECOMMENDATIONL Surgical or oncologic management. ER neg/AK neg   5/24/24 ([facility]) Final surgical pathology: 2.0cm DCIS, flat and micropapillary patterns with high nuclear grade and central necrosis, ER- AK-; two biopsy sites identified, calcs associated with DCIS, and benign nipple/skin; margins clear. Lymph nodes negative (0/2).

## 2024-08-21 NOTE — HISTORY OF PRESENT ILLNESS
[FreeTextEntry1] : Pilar Hylton is a 63 yo F with AJCC 8th Ed Stage 0 (pTisN0) LEFT lower central Breast ER-/[place]- breast DCIS s/p 5/24/24 Left central loc lumpectomy and Left SLNBx (20mm, grade III, no LVI, margins negative, distance from DCIS to the closest margin is greater than 2mm, 0/3 nodes positive, oncotype pending) who is referred by Dr. Rankin for discussion of radiation to the Left breast.     8/21/2024 OTV 3710/4240 cGy, 14/16 Fx to the left breast. She came to clinic prior to today RT treatment. Overall, she is tolerating her therapy Left breast skin has mild hyperpigmentation. She continues to use mometasone cream as directed. She will continue planned treatments.  8/14/24: OTV 2650cGy/4240cGy, 10/16fx to the Left breast. Mrs. Hylton reports feeling well. She has mild fatigue which is relieved by rest. Appetite is good. Skin is good with mild hyperpigmentation. She is using mometasone as directed. ROM is good. Plan to continue radiation.   8/6/24: OTV 1325cGy/4200cGy, 5/16fx to the Left breast, 0/1000cGy boost. Ms. Hylton reports feeling well. Appetite is good. She denies fatigue. Skin is good. She is using mometasone as directed. ROM is good. Plan to continue radiation.   7/31/24: OTV 530cGy/4240cGy, 2/16fx to the Left breast, 0/1000cGy boost. Ms. Hylton reports feeling well. Appetite is good. Denies fatigue. Skin is good. Script sent for mometasone, and she was instructed in use. ROM is good. Plan to continue radiation.   6/18/24: Consultation  Mrs. Hylton presents for discussion of adjuvant radiation. No history of prior radiation, PPM/ICD, or connective tissue disorders. She lives with her  and is independent in ADLs. She has an appointment with Dr. Prasad to discuss systemic tx.   History of Present Illness  ______________________________  Pilar Hylton is a 63 yo F with pmh HTN who was found to have Left breast DCIS in setting of abnormal screening mammogram   11/22/23 ([facility]) B/L sMMG: heterogeneously dense. L breast questionable calcifications in lower inner and lower outer breast spanning anterior through mid depth. BI-RADS 0, incomplete FOLLOW UP: additional imaging views and possible L breast stereotactic guided core biopsy.   01/03/24 ([facility]) Left DX MMG: There are scattered areas of fibroglandular density. There are fine linear branching calcifications extending from within 15 mm of the nipple along 6:00, total extent approximately 10 cm, highly suspicious for DCIS. BI-RADS 4C, Suspicious. FOLLOW UP: Recommend 2 site stereotactic biopsy of the anterior and posterior extent.   01/12/24 ([facility]) L stereotactic biopsy x 2 sites:  Site 1 - Left 6:00, 10cm area of branching calficications, posterior: DCIS, cribriform and micropapillary types, intermediate nuclear grade and marked necrosis; calcifications associated with DCIS  Site 2 - Left 6:00, 10cm area of branching calcifications, anterior: DCIS, intermediate nuclear grade and necrosis; calcifications associated with DCIS.  Concordant, malignant. RECOMMENDATIONL Surgical or oncologic management. ER neg/[place] neg   5/24/24 ([facility]) Final surgical pathology: 2.0cm DCIS, flat and micropapillary patterns with high nuclear grade and central necrosis, ER- [place]-; two biopsy sites identified, calcs associated with DCIS, and benign nipple/skin; margins clear. Lymph nodes negative (0/2).

## 2024-08-21 NOTE — DISEASE MANAGEMENT
[Pathological] : TNM Stage: p [TTNM] : is [MTNM] : X [NTNM] : 0 [0] : 0 [de-identified] : [id number] [de-identified] : 5240cGy [de-identified] : Left breast + boost

## 2024-08-21 NOTE — HISTORY OF PRESENT ILLNESS
[FreeTextEntry1] : Pilar Hylton is a 63 yo F with AJCC 8th Ed Stage 0 (pTisN0) LEFT lower central Breast ER-/PA- breast DCIS s/p 5/24/24 Left central loc lumpectomy and Left SLNBx (20mm, grade III, no LVI, margins negative, distance from DCIS to the closest margin is greater than 2mm, 0/3 nodes positive, oncotype pending) who is referred by Dr. Rankin for discussion of radiation to the Left breast.     8/21/2024 OTV 3710/4240 cGy, 14/16 Fx to the left breast. She came to clinic prior to today RT treatment. Overall, she is tolerating her therapy Left breast skin has mild hyperpigmentation. She continues to use mometasone cream as directed. She will continue planned treatments.  8/14/24: OTV 2650cGy/4240cGy, 10/16fx to the Left breast. Mrs. Hylton reports feeling well. She has mild fatigue which is relieved by rest. Appetite is good. Skin is good with mild hyperpigmentation. She is using mometasone as directed. ROM is good. Plan to continue radiation.   8/6/24: OTV 1325cGy/4200cGy, 5/16fx to the Left breast, 0/1000cGy boost. Ms. Hylton reports feeling well. Appetite is good. She denies fatigue. Skin is good. She is using mometasone as directed. ROM is good. Plan to continue radiation.   7/31/24: OTV 530cGy/4240cGy, 2/16fx to the Left breast, 0/1000cGy boost. Ms. Hylton reports feeling well. Appetite is good. Denies fatigue. Skin is good. Script sent for mometasone, and she was instructed in use. ROM is good. Plan to continue radiation.   6/18/24: Consultation  Mrs. Hylton presents for discussion of adjuvant radiation. No history of prior radiation, PPM/ICD, or connective tissue disorders. She lives with her  and is independent in ADLs. She has an appointment with Dr. Prasad to discuss systemic tx.   History of Present Illness  ______________________________  Pilar Hylton is a 63 yo F with pmh HTN who was found to have Left breast DCIS in setting of abnormal screening mammogram   11/22/23 ([facility]) B/L sMMG: heterogeneously dense. L breast questionable calcifications in lower inner and lower outer breast spanning anterior through mid depth. BI-RADS 0, incomplete FOLLOW UP: additional imaging views and possible L breast stereotactic guided core biopsy.   01/03/24 ([facility]) Left DX MMG: There are scattered areas of fibroglandular density. There are fine linear branching calcifications extending from within 15 mm of the nipple along 6:00, total extent approximately 10 cm, highly suspicious for DCIS. BI-RADS 4C, Suspicious. FOLLOW UP: Recommend 2 site stereotactic biopsy of the anterior and posterior extent.   01/12/24 ([facility]) L stereotactic biopsy x 2 sites:  Site 1 - Left 6:00, 10cm area of branching calficications, posterior: DCIS, cribriform and micropapillary types, intermediate nuclear grade and marked necrosis; calcifications associated with DCIS  Site 2 - Left 6:00, 10cm area of branching calcifications, anterior: DCIS, intermediate nuclear grade and necrosis; calcifications associated with DCIS.  Concordant, malignant. RECOMMENDATIONL Surgical or oncologic management. ER neg/PA neg   5/24/24 ([facility]) Final surgical pathology: 2.0cm DCIS, flat and micropapillary patterns with high nuclear grade and central necrosis, ER- PA-; two biopsy sites identified, calcs associated with DCIS, and benign nipple/skin; margins clear. Lymph nodes negative (0/2).

## 2024-08-21 NOTE — DISEASE MANAGEMENT
[Pathological] : TNM Stage: p [TTNM] : is [NTNM] : 0 [MTNM] : X [0] : 0 [de-identified] : [id number] [de-identified] : 5240cGy [de-identified] : Left breast + boost

## 2024-08-21 NOTE — HISTORY OF PRESENT ILLNESS
[FreeTextEntry1] : Pilar Hylton is a 63 yo F with AJCC 8th Ed Stage 0 (pTisN0) LEFT lower central Breast ER-/TN- breast DCIS s/p 5/24/24 Left central loc lumpectomy and Left SLNBx (20mm, grade III, no LVI, margins negative, distance from DCIS to the closest margin is greater than 2mm, 0/3 nodes positive, oncotype pending) who is referred by Dr. Rankin for discussion of radiation to the Left breast.     8/21/2024 OTV 3710/4240 cGy, 14/16 Fx to the left breast. She came to clinic prior to today RT treatment. Overall, she is tolerating her therapy Left breast skin has mild hyperpigmentation. She continues to use mometasone cream as directed. She will continue planned treatments.  8/14/24: OTV 2650cGy/4240cGy, 10/16fx to the Left breast. Mrs. Hylton reports feeling well. She has mild fatigue which is relieved by rest. Appetite is good. Skin is good with mild hyperpigmentation. She is using mometasone as directed. ROM is good. Plan to continue radiation.   8/6/24: OTV 1325cGy/4200cGy, 5/16fx to the Left breast, 0/1000cGy boost. Ms. Hylton reports feeling well. Appetite is good. She denies fatigue. Skin is good. She is using mometasone as directed. ROM is good. Plan to continue radiation.   7/31/24: OTV 530cGy/4240cGy, 2/16fx to the Left breast, 0/1000cGy boost. Ms. Hylton reports feeling well. Appetite is good. Denies fatigue. Skin is good. Script sent for mometasone, and she was instructed in use. ROM is good. Plan to continue radiation.   6/18/24: Consultation  Mrs. Hylton presents for discussion of adjuvant radiation. No history of prior radiation, PPM/ICD, or connective tissue disorders. She lives with her  and is independent in ADLs. She has an appointment with Dr. Prasad to discuss systemic tx.   History of Present Illness  ______________________________  Pilar Hylton is a 63 yo F with pmh HTN who was found to have Left breast DCIS in setting of abnormal screening mammogram   11/22/23 ([facility]) B/L sMMG: heterogeneously dense. L breast questionable calcifications in lower inner and lower outer breast spanning anterior through mid depth. BI-RADS 0, incomplete FOLLOW UP: additional imaging views and possible L breast stereotactic guided core biopsy.   01/03/24 ([facility]) Left DX MMG: There are scattered areas of fibroglandular density. There are fine linear branching calcifications extending from within 15 mm of the nipple along 6:00, total extent approximately 10 cm, highly suspicious for DCIS. BI-RADS 4C, Suspicious. FOLLOW UP: Recommend 2 site stereotactic biopsy of the anterior and posterior extent.   01/12/24 ([facility]) L stereotactic biopsy x 2 sites:  Site 1 - Left 6:00, 10cm area of branching calficications, posterior: DCIS, cribriform and micropapillary types, intermediate nuclear grade and marked necrosis; calcifications associated with DCIS  Site 2 - Left 6:00, 10cm area of branching calcifications, anterior: DCIS, intermediate nuclear grade and necrosis; calcifications associated with DCIS.  Concordant, malignant. RECOMMENDATIONL Surgical or oncologic management. ER neg/TN neg   5/24/24 ([facility]) Final surgical pathology: 2.0cm DCIS, flat and micropapillary patterns with high nuclear grade and central necrosis, ER- TN-; two biopsy sites identified, calcs associated with DCIS, and benign nipple/skin; margins clear. Lymph nodes negative (0/2).

## 2024-08-21 NOTE — DISEASE MANAGEMENT
[Pathological] : TNM Stage: p [TTNM] : is [NTNM] : 0 [MTNM] : X [0] : 0 [de-identified] : [id number] [de-identified] : 5240cGy [de-identified] : Left breast + boost

## 2024-08-21 NOTE — DISEASE MANAGEMENT
[Pathological] : TNM Stage: p [TTNM] : is [NTNM] : 0 [MTNM] : X [0] : 0 [de-identified] : [id number] [de-identified] : 5240cGy [de-identified] : Left breast + boost

## 2024-08-22 ENCOUNTER — NON-APPOINTMENT (OUTPATIENT)
Age: 64
End: 2024-08-22

## 2024-08-27 NOTE — HISTORY OF PRESENT ILLNESS
[FreeTextEntry1] : Pilar Hylton is a 65 yo F with AJCC 8th Ed Stage 0 (pTisN0) LEFT lower central Breast ER-/[place]- breast DCIS s/p 5/24/24 Left central loc lumpectomy and Left SLNBx (20mm, grade III, no LVI, margins negative, distance from DCIS to the closest margin is greater than 2mm, 0/3 nodes positive, oncotype pending) who is referred by Dr. Rankin for discussion of radiation to the Left breast.    8/28/24: Final OTV 4240Cgy/4240cGy, 16/16fx to the Left breast, ?/1000cGy boost  8/21/2024 OTV 3710/4240 cGy, 14/16 Fx to the left breast. She came to clinic prior to today RT treatment. Overall, she is tolerating her therapy Left breast skin has mild hyperpigmentation. She continues to use mometasone cream as directed. She will continue planned treatments.  8/14/24: OTV 2650cGy/4240cGy, 10/16fx to the Left breast. Mrs. Hylton reports feeling well. She has mild fatigue which is relieved by rest. Appetite is good. Skin is good with mild hyperpigmentation. She is using mometasone as directed. ROM is good. Plan to continue radiation.   8/6/24: OTV 1325cGy/4200cGy, 5/16fx to the Left breast, 0/1000cGy boost. Ms. Hylton reports feeling well. Appetite is good. She denies fatigue. Skin is good. She is using mometasone as directed. ROM is good. Plan to continue radiation.   7/31/24: OTV 530cGy/4240cGy, 2/16fx to the Left breast, 0/1000cGy boost. Ms. Hylton reports feeling well. Appetite is good. Denies fatigue. Skin is good. Script sent for mometasone, and she was instructed in use. ROM is good. Plan to continue radiation.   6/18/24: Consultation  Mrs. Hylton presents for discussion of adjuvant radiation. No history of prior radiation, PPM/ICD, or connective tissue disorders. She lives with her  and is independent in ADLs. She has an appointment with Dr. Prasad to discuss systemic tx.   History of Present Illness  ______________________________  Pilar Hylton is a 65 yo F with pmh HTN who was found to have Left breast DCIS in setting of abnormal screening mammogram   11/22/23 ([facility]) B/L sMMG: heterogeneously dense. L breast questionable calcifications in lower inner and lower outer breast spanning anterior through mid depth. BI-RADS 0, incomplete FOLLOW UP: additional imaging views and possible L breast stereotactic guided core biopsy.   01/03/24 ([facility]) Left DX MMG: There are scattered areas of fibroglandular density. There are fine linear branching calcifications extending from within 15 mm of the nipple along 6:00, total extent approximately 10 cm, highly suspicious for DCIS. BI-RADS 4C, Suspicious. FOLLOW UP: Recommend 2 site stereotactic biopsy of the anterior and posterior extent.   01/12/24 ([facility]) L stereotactic biopsy x 2 sites:  Site 1 - Left 6:00, 10cm area of branching calficications, posterior: DCIS, cribriform and micropapillary types, intermediate nuclear grade and marked necrosis; calcifications associated with DCIS  Site 2 - Left 6:00, 10cm area of branching calcifications, anterior: DCIS, intermediate nuclear grade and necrosis; calcifications associated with DCIS.  Concordant, malignant. RECOMMENDATIONL Surgical or oncologic management. ER neg/[place] neg   5/24/24 ([facility]) Final surgical pathology: 2.0cm DCIS, flat and micropapillary patterns with high nuclear grade and central necrosis, ER- [place]-; two biopsy sites identified, calcs associated with DCIS, and benign nipple/skin; margins clear. Lymph nodes negative (0/2).

## 2024-08-27 NOTE — DISEASE MANAGEMENT
[Pathological] : TNM Stage: p [TTNM] : is [NTNM] : 0 [MTNM] : X [0] : 0 [de-identified] : [id number] [de-identified] : 5240cGy [de-identified] : Left breast + boost

## 2024-08-28 ENCOUNTER — NON-APPOINTMENT (OUTPATIENT)
Age: 64
End: 2024-08-28

## 2024-09-04 ENCOUNTER — NON-APPOINTMENT (OUTPATIENT)
Age: 64
End: 2024-09-04

## 2024-09-04 NOTE — HISTORY OF PRESENT ILLNESS
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 Rankin for discussion of radiation to the Left breast.    9/4/24: Final OTV 4240cGy/4240cGy, 16/16fx to the Left breast, 500cGy/1000cGy, 2/4fx boost. Patient has no fatigue. Appetite is good. ROM both arms is good. Mild irritation on R breast incision site, L breast site of drainage has completely healed. Patient is using mometasone cream and OTC cream. Discharge instructions reviewed with patient, patient confirms understanding of instructions. ERPR neg no endocrine therapy. Plan to complete radiation, and plan for PTE in October.   8/21/2024 OTV 3710/4240 cGy, 14/16 Fx to the left breast. She came to clinic prior to today RT treatment. Overall, she is tolerating her therapy Left breast skin has mild hyperpigmentation. She continues to use mometasone cream as directed. She will continue planned treatments.  8/14/24: OTV 2650cGy/4240cGy, 10/16fx to the Left breast. Mrs. Hylton reports feeling well. She has mild fatigue which is relieved by rest. Appetite is good. Skin is good with mild hyperpigmentation. She is using mometasone as directed. ROM is good. Plan to continue radiation.   8/6/24: OTV 1325cGy/4200cGy, 5/16fx to the Left breast, 0/1000cGy boost. Ms. Hylton reports feeling well. Appetite is good. She denies fatigue. Skin is good. She is using mometasone as directed. ROM is good. Plan to continue radiation.   7/31/24: OTV 530cGy/4240cGy, 2/16fx to the Left breast, 0/1000cGy boost. Ms. Hylton reports feeling well. Appetite is good. Denies fatigue. Skin is good. Script sent for mometasone, and she was instructed in use. ROM is good. Plan to continue radiation.   6/18/24: Consultation  Mrs. Hylton presents for discussion of adjuvant radiation. No history of prior radiation, PPM/ICD, or connective tissue disorders. She lives with her  and is independent in ADLs. She has an appointment with Dr. Prasad to discuss systemic tx.   History of Present Illness  ______________________________  Pilar Hylton is a 65 yo F with pmh HTN who was found to have Left breast DCIS in setting of abnormal screening mammogram   11/22/23 ([facility]) B/L sMMG: heterogeneously dense. L breast questionable calcifications in lower inner and lower outer breast spanning anterior through mid depth. BI-RADS 0, incomplete FOLLOW UP: additional imaging views and possible L breast stereotactic guided core biopsy.   01/03/24 ([facility]) Left DX MMG: There are scattered areas of fibroglandular density. There are fine linear branching calcifications extending from within 15 mm of the nipple along 6:00, total extent approximately 10 cm, highly suspicious for DCIS. BI-RADS 4C, Suspicious. FOLLOW UP: Recommend 2 site stereotactic biopsy of the anterior and posterior extent.   01/12/24 ([facility]) L stereotactic biopsy x 2 sites:  Site 1 - Left 6:00, 10cm area of branching calficications, posterior: DCIS, cribriform and micropapillary types, intermediate nuclear grade and marked necrosis; calcifications associated with DCIS  Site 2 - Left 6:00, 10cm area of branching calcifications, anterior: DCIS, intermediate nuclear grade and necrosis; calcifications associated with DCIS.  Concordant, malignant. RECOMMENDATIONL Surgical or oncologic management. ER neg/CO neg   5/24/24 ([facility]) Final surgical pathology: 2.0cm DCIS, flat and micropapillary patterns with high nuclear grade and central necrosis, ER- CO-; two biopsy sites identified, calcs associated with DCIS, and benign nipple/skin; margins clear. Lymph nodes negative (0/2).

## 2024-09-04 NOTE — DISEASE MANAGEMENT
[TTNM] : is [NTNM] : 0 [MTNM] : X [de-identified] : 8370cIq [de-identified] : 5240cGy [de-identified] : Left breast + boost

## 2024-09-04 NOTE — HISTORY OF PRESENT ILLNESS
[FreeTextEntry1] : Pilar Hylton is a 63 yo F with AJCC 8th Ed Stage 0 (pTisN0) LEFT lower central Breast ER-/CA- breast DCIS s/p 5/24/24 Left central loc lumpectomy and Left SLNBx (20mm, grade III, no LVI, margins negative, distance from DCIS to the closest margin is greater than 2mm, 0/3 nodes positive, oncotype pending) who is referred by Dr. Rankin for discussion of radiation to the Left breast.    9/4/24: Final OTV 4240cGy/4240cGy, 16/16fx to the Left breast, 500cGy/1000cGy, 2/4fx boost. Patient has no fatigue. Appetite is good. ROM both arms is good. Mild irritation on R breast incision site, L breast site of drainage has completely healed. Patient is using mometasone cream and OTC cream. Discharge instructions reviewed with patient, patient confirms understanding of instructions. ERPR neg no endocrine therapy. Plan to complete radiation, and plan for PTE in October.   8/21/2024 OTV 3710/4240 cGy, 14/16 Fx to the left breast. She came to clinic prior to today RT treatment. Overall, she is tolerating her therapy Left breast skin has mild hyperpigmentation. She continues to use mometasone cream as directed. She will continue planned treatments.  8/14/24: OTV 2650cGy/4240cGy, 10/16fx to the Left breast. Mrs. Hylton reports feeling well. She has mild fatigue which is relieved by rest. Appetite is good. Skin is good with mild hyperpigmentation. She is using mometasone as directed. ROM is good. Plan to continue radiation.   8/6/24: OTV 1325cGy/4200cGy, 5/16fx to the Left breast, 0/1000cGy boost. Ms. Hylton reports feeling well. Appetite is good. She denies fatigue. Skin is good. She is using mometasone as directed. ROM is good. Plan to continue radiation.   7/31/24: OTV 530cGy/4240cGy, 2/16fx to the Left breast, 0/1000cGy boost. Ms. Hylton reports feeling well. Appetite is good. Denies fatigue. Skin is good. Script sent for mometasone, and she was instructed in use. ROM is good. Plan to continue radiation.   6/18/24: Consultation  Mrs. Hylton presents for discussion of adjuvant radiation. No history of prior radiation, PPM/ICD, or connective tissue disorders. She lives with her  and is independent in ADLs. She has an appointment with Dr. Prasad to discuss systemic tx.   History of Present Illness  ______________________________  Pilar Hylton is a 63 yo F with pmh HTN who was found to have Left breast DCIS in setting of abnormal screening mammogram   11/22/23 ([facility]) B/L sMMG: heterogeneously dense. L breast questionable calcifications in lower inner and lower outer breast spanning anterior through mid depth. BI-RADS 0, incomplete FOLLOW UP: additional imaging views and possible L breast stereotactic guided core biopsy.   01/03/24 ([facility]) Left DX MMG: There are scattered areas of fibroglandular density. There are fine linear branching calcifications extending from within 15 mm of the nipple along 6:00, total extent approximately 10 cm, highly suspicious for DCIS. BI-RADS 4C, Suspicious. FOLLOW UP: Recommend 2 site stereotactic biopsy of the anterior and posterior extent.   01/12/24 ([facility]) L stereotactic biopsy x 2 sites:  Site 1 - Left 6:00, 10cm area of branching calficications, posterior: DCIS, cribriform and micropapillary types, intermediate nuclear grade and marked necrosis; calcifications associated with DCIS  Site 2 - Left 6:00, 10cm area of branching calcifications, anterior: DCIS, intermediate nuclear grade and necrosis; calcifications associated with DCIS.  Concordant, malignant. RECOMMENDATIONL Surgical or oncologic management. ER neg/CA neg   5/24/24 ([facility]) Final surgical pathology: 2.0cm DCIS, flat and micropapillary patterns with high nuclear grade and central necrosis, ER- CA-; two biopsy sites identified, calcs associated with DCIS, and benign nipple/skin; margins clear. Lymph nodes negative (0/2).

## 2024-09-04 NOTE — DISEASE MANAGEMENT
[TTNM] : is [NTNM] : 0 [MTNM] : X [de-identified] : [id number] [de-identified] : 5240cGy [de-identified] : Left breast + boost

## 2024-09-04 NOTE — REVIEW OF SYSTEMS
[Nausea: Grade 0] : Nausea: Grade 0 [Vomiting: Grade 0] : Vomiting: Grade 0 [Fatigue: Grade 0] : Fatigue: Grade 0 [Breast Pain: Grade 0] : Breast Pain: Grade 0 [Headache: Grade 0] : Headache: Grade 0 [Anxiety: Grade 0] : Anxiety: Grade 0  [Depression: Grade 0] : Depression: Grade 0 [Skin Hyperpigmentation: Grade 1 - Hyperpigmentation covering <10% BSA; no psychosocial impact] : Skin Hyperpigmentation: Grade 1 - Hyperpigmentation covering <10% BSA; no psychosocial impact [Dermatitis Radiation: Grade 2 - Moderate to brisk erythema; patchy moist desquamation, mostly confined to skin folds and creases; moderate edema] : Dermatitis Radiation: Grade 2 - Moderate to brisk erythema; patchy moist desquamation, mostly confined to skin folds and creases; moderate edema [FreeTextEntry6] : Healing; using mometasone cream

## 2024-09-10 ENCOUNTER — NON-APPOINTMENT (OUTPATIENT)
Age: 64
End: 2024-09-10

## 2024-09-10 NOTE — ASSESSMENT
[FreeTextEntry1] : 64 year old female presents for follow up evaluation s/p Left central localized lumpectomy + L SLNB on 5/24/24 of Left DCIS, intermediate nuclear grade and necrosis with associated calcifications, ER neg, ID neg; initially seen on screening mammo with subsequent dx imaging and proven on stereo bx x2 sites (anterior and posterior) of L 6:00 1cmfn 10cm branching calcifications.  Final surgical pathology yielded 2.0cm DCIS, flat and micropapillary patterns with high nuclear grade and central necrosis, ER- ID-; two biopsy sites identified, calcs associated with DCIS, and benign nipple/skin; margins clear. LN neg (0/2).  Patient declined genetic testing. S/p XRT. No AI given HR- tumor.   Patient without complaint. Physical exam WNL. Plan for B/L DX MMG/US, re-examination and repeat sozo measurement in Nov 2024.

## 2024-09-10 NOTE — REASON FOR VISIT
[Follow-Up: _____] : a [unfilled] follow-up visit [FreeTextEntry1] : L DCIS s/p L central localized lumpx + L SLNB

## 2024-09-10 NOTE — PAST MEDICAL HISTORY
[Menarche Age ____] : age at menarche was [unfilled] [Menopause Age____] : age at menopause was [unfilled] [History of Hormone Replacement Treatment] : has no history of hormone replacement treatment [Total Preg ___] : G[unfilled] [Live Births ___] : P[unfilled]  [Age At Live Birth ___] : Age at live birth: [unfilled] [FreeTextEntry6] : No [FreeTextEntry7] : No [FreeTextEntry8] : No

## 2024-09-10 NOTE — HISTORY OF PRESENT ILLNESS
[FreeTextEntry1] : 64 year old female presents for follow up evaluation s/p Left central localized lumpectomy + L SLNB with right symmetry reduction (Dr. Blakely) on 5/24/24 of Left DCIS, intermediate nuclear grade and necrosis with associated calcifications, ER neg, OK neg; initially seen on screening mammo with subsequent dx imaging and proven on stereo bx x2 sites (anterior and posterior) of L 6:00 1cmfn 10cm branching calcifications.  Final surgical pathology yielded 2.0cm DCIS, flat and micropapillary patterns with high nuclear grade and central necrosis, ER- OK-; two biopsy sites identified, calcs associated with DCIS, and benign nipple/skin; margins clear. LN neg (0/2).  Patient s/p XRT (Dr. Carrillo), completed 9/4/24. Patient met with med onc Dr. Prasad who discussed no indication for adjuvant endocrine therapy given tumor is ER/OK negative.   Of note, at initial office visit the patient reported one incidence of clear, non-spontaneous, non-bloody L nipple discharge in late Nov 2023. Patient denies palpable masses or skin changes.   Patient reports family history of breast cancer in 4 maternal first cousins - age 20's, 30's, 40's and 50's respectively. Denies famhx of ovarian cancer. Patient has 1 biological child (daughter age 46). Declined genetic testing.   Patient reports history of HTN, acid reflux. Denies history of heart disease, DM, blood clots, sleep apnea, COPD, issues with anesthesia. Patient denies any known drug allergies.  Patient has been set up with [employer].   TNM Stage: p T is N 0 M X AJCC Stage (8th Ed): 0.

## 2024-09-10 NOTE — PHYSICAL EXAM
[Supple] : supple [No Supraclavicular Adenopathy] : no supraclavicular adenopathy [Examined in the supine and seated position] : examined in the supine and seated position [No dominant masses] : no dominant masses in right breast  [No dominant masses] : no dominant masses left breast [No Nipple Retraction] : no right nipple retraction [No Nipple Discharge] : no right nipple discharge [No Axillary Lymphadenopathy] : no left axillary lymphadenopathy [de-identified] : incisions c/d/i [de-identified] : acquired absence of L nipple

## 2024-09-10 NOTE — DATA REVIEWED
[FreeTextEntry1] : 11/22/23 ([facility]) B/L sMMG: heterogeneously dense. L breast questionable calcifications in lower inner and lower outer breast spanning anterior through mid depth. BI-RADS 0, incomplete FOLLOW UP: additional imaging views and possible L breast stereotactic guided core biopsy.  01/03/24 ([facility]) Left DX MMG: There are scattered areas of fibroglandular density. There are fine linear branching calcifications extending from within 15 mm of the nipple along 6:00, total extent approximately 10 cm, highly suspicious for DCIS. BI-RADS 4C, Suspicious. FOLLOW UP: Recommend 2 site stereotactic biopsy of the anterior and posterior extent.  01/12/24 ([facility]) L stereotactic biopsy x 2 sites: Site 1 - Left 6:00, 10cm area of branching calficications, posterior: DCIS, cribriform and micropapillary types, intermediate nuclear grade and marked necrosis; calcifications associated with DCIS Site 2 - Left 6:00, 10cm area of branching calcifications, anterior: DCIS, intermediate nuclear grade and necrosis; calcifications associated with DCIS. Concordant, malignant. RECOMMENDATIONL Surgical or oncologic management. ER neg/MI neg   5/24/24 ([facility]) Final surgical pathology: 2.0cm DCIS, flat and micropapillary patterns with high nuclear grade and central necrosis, ER- MI-; two biopsy sites identified, calcs associated with DCIS, and benign nipple/skin; margins clear. Lymph nodes negative (0/2).

## 2024-09-11 ENCOUNTER — APPOINTMENT (OUTPATIENT)
Dept: BREAST CENTER | Facility: CLINIC | Age: 64
End: 2024-09-11

## 2024-09-11 DIAGNOSIS — D05.12 INTRADUCTAL CARCINOMA IN SITU OF LEFT BREAST: ICD-10-CM

## 2024-09-25 ENCOUNTER — APPOINTMENT (OUTPATIENT)
Dept: BREAST CENTER | Facility: CLINIC | Age: 64
End: 2024-09-25
Payer: MEDICARE

## 2024-09-25 VITALS — WEIGHT: 190 LBS | BODY MASS INDEX: 28.79 KG/M2 | HEIGHT: 68 IN

## 2024-09-25 DIAGNOSIS — D05.12 INTRADUCTAL CARCINOMA IN SITU OF LEFT BREAST: ICD-10-CM

## 2024-09-25 PROCEDURE — 99213 OFFICE O/P EST LOW 20 MIN: CPT

## 2024-09-25 PROCEDURE — 93702 BIS XTRACELL FLUID ANALYSIS: CPT

## 2024-09-25 RX ORDER — LOSARTAN POTASSIUM 100 MG/1
TABLET, FILM COATED ORAL
Refills: 0 | Status: ACTIVE | COMMUNITY

## 2024-09-29 NOTE — PLAN
[TextEntry] : - sozo today - B/L DX MMG/US Nov 2024  - If benign, RTC in March 2025 (+sozo) -  [facility]

## 2024-09-29 NOTE — DATA REVIEWED
[FreeTextEntry1] : 11/22/23 ([facility]) B/L sMMG: heterogeneously dense. L breast questionable calcifications in lower inner and lower outer breast spanning anterior through mid depth. BI-RADS 0, incomplete FOLLOW UP: additional imaging views and possible L breast stereotactic guided core biopsy.  01/03/24 ([facility]) Left DX MMG: There are scattered areas of fibroglandular density. There are fine linear branching calcifications extending from within 15 mm of the nipple along 6:00, total extent approximately 10 cm, highly suspicious for DCIS. BI-RADS 4C, Suspicious. FOLLOW UP: Recommend 2 site stereotactic biopsy of the anterior and posterior extent.  01/12/24 ([facility]) L stereotactic biopsy x 2 sites: Site 1 - Left 6:00, 10cm area of branching calficications, posterior: DCIS, cribriform and micropapillary types, intermediate nuclear grade and marked necrosis; calcifications associated with DCIS Site 2 - Left 6:00, 10cm area of branching calcifications, anterior: DCIS, intermediate nuclear grade and necrosis; calcifications associated with DCIS. Concordant, malignant. RECOMMENDATIONL Surgical or oncologic management. ER neg/NM neg   5/24/24 ([facility]) Final surgical pathology: 2.0cm DCIS, flat and micropapillary patterns with high nuclear grade and central necrosis, ER- NM-; two biopsy sites identified, calcs associated with DCIS, and benign nipple/skin; margins clear. Lymph nodes negative (0/2).

## 2024-09-29 NOTE — ASSESSMENT
[FreeTextEntry1] : 64 year old female presents for follow up evaluation s/p Left central localized lumpectomy + L SLNB on 5/24/24 of Left DCIS, intermediate nuclear grade and necrosis with associated calcifications, ER neg, MT neg; initially seen on screening mammo with subsequent dx imaging and proven on stereo bx x2 sites (anterior and posterior) of L 6:00 1cmfn 10cm branching calcifications.  Final surgical pathology yielded 2.0cm DCIS, flat and micropapillary patterns with high nuclear grade and central necrosis, ER- MT-; two biopsy sites identified, calcs associated with DCIS, and benign nipple/skin; margins clear. LN neg (0/2).  Patient declined genetic testing. S/p XRT. No AI given HR- tumor.   Patient without complaint. Physical exam WNL. Sozo measurement obtained in office today, WNL. Plan for B/L DX MMG/US in November 2024, and if benign plan for re-examination and repeat sozo measurement in 6 months (March 2025). Patient requesting PCP referral, [facility]  [facility]. Patient verbalizes understanding and is in agreement with the plan.  show

## 2024-09-29 NOTE — HISTORY OF PRESENT ILLNESS
[FreeTextEntry1] : 64 year old female presents for follow up evaluation s/p Left central localized lumpectomy + L SLNB with right symmetry reduction (Dr. Blakely) on 5/24/24 of Left DCIS, intermediate nuclear grade and necrosis with associated calcifications, ER neg, MD neg; initially seen on screening mammo with subsequent dx imaging and proven on stereo bx x2 sites (anterior and posterior) of L 6:00 1cmfn 10cm branching calcifications.  Final surgical pathology yielded 2.0cm DCIS, flat and micropapillary patterns with high nuclear grade and central necrosis, ER- MD-; two biopsy sites identified, calcs associated with DCIS, and benign nipple/skin; margins clear. LN neg (0/2).  Patient s/p XRT (Dr. Carrillo), completed 9/4/24. Patient met with med onc Dr. Prasad who discussed no indication for adjuvant endocrine therapy given tumor is ER/MD negative.   Of note, at initial office visit the patient reported one incidence of clear, non-spontaneous, non-bloody L nipple discharge in late Nov 2023. Patient denies palpable masses or skin changes.   Patient reports family history of breast cancer in 4 maternal first cousins - age 20's, 30's, 40's and 50's respectively. Denies famhx of ovarian cancer. Patient has 1 biological child (daughter age 46). Declined genetic testing.   Patient reports history of HTN, acid reflux. Denies history of heart disease, DM, blood clots, sleep apnea, COPD, issues with anesthesia. Patient denies any known drug allergies.  Patient has been set up with [email or web address].   TNM Stage: p T is N 0 M X AJCC Stage (8th Ed): 0.

## 2024-09-29 NOTE — PLAN
[TextEntry] : - sozo today - B/L DX MMG/US Nov 2024  - If benign, RTC in March 2025 (+sozo) -  [facility]

## 2024-09-29 NOTE — DATA REVIEWED
[FreeTextEntry1] : 11/22/23 ([facility]) B/L sMMG: heterogeneously dense. L breast questionable calcifications in lower inner and lower outer breast spanning anterior through mid depth. BI-RADS 0, incomplete FOLLOW UP: additional imaging views and possible L breast stereotactic guided core biopsy.  01/03/24 ([facility]) Left DX MMG: There are scattered areas of fibroglandular density. There are fine linear branching calcifications extending from within 15 mm of the nipple along 6:00, total extent approximately 10 cm, highly suspicious for DCIS. BI-RADS 4C, Suspicious. FOLLOW UP: Recommend 2 site stereotactic biopsy of the anterior and posterior extent.  01/12/24 ([facility]) L stereotactic biopsy x 2 sites: Site 1 - Left 6:00, 10cm area of branching calficications, posterior: DCIS, cribriform and micropapillary types, intermediate nuclear grade and marked necrosis; calcifications associated with DCIS Site 2 - Left 6:00, 10cm area of branching calcifications, anterior: DCIS, intermediate nuclear grade and necrosis; calcifications associated with DCIS. Concordant, malignant. RECOMMENDATIONL Surgical or oncologic management. ER neg/NE neg   5/24/24 ([facility]) Final surgical pathology: 2.0cm DCIS, flat and micropapillary patterns with high nuclear grade and central necrosis, ER- NE-; two biopsy sites identified, calcs associated with DCIS, and benign nipple/skin; margins clear. Lymph nodes negative (0/2).

## 2024-09-29 NOTE — ASSESSMENT
[FreeTextEntry1] : 64 year old female presents for follow up evaluation s/p Left central localized lumpectomy + L SLNB on 5/24/24 of Left DCIS, intermediate nuclear grade and necrosis with associated calcifications, ER neg, IL neg; initially seen on screening mammo with subsequent dx imaging and proven on stereo bx x2 sites (anterior and posterior) of L 6:00 1cmfn 10cm branching calcifications.  Final surgical pathology yielded 2.0cm DCIS, flat and micropapillary patterns with high nuclear grade and central necrosis, ER- IL-; two biopsy sites identified, calcs associated with DCIS, and benign nipple/skin; margins clear. LN neg (0/2).  Patient declined genetic testing. S/p XRT. No AI given HR- tumor.   Patient without complaint. Physical exam WNL. Sozo measurement obtained in office today, WNL. Plan for B/L DX MMG/US in November 2024, and if benign plan for re-examination and repeat sozo measurement in 6 months (March 2025). Patient requesting PCP referral, [facility]  [facility]. Patient verbalizes understanding and is in agreement with the plan.

## 2024-09-29 NOTE — ASSESSMENT
[FreeTextEntry1] : 64 year old female presents for follow up evaluation s/p Left central localized lumpectomy + L SLNB on 5/24/24 of Left DCIS, intermediate nuclear grade and necrosis with associated calcifications, ER neg, CO neg; initially seen on screening mammo with subsequent dx imaging and proven on stereo bx x2 sites (anterior and posterior) of L 6:00 1cmfn 10cm branching calcifications.  Final surgical pathology yielded 2.0cm DCIS, flat and micropapillary patterns with high nuclear grade and central necrosis, ER- CO-; two biopsy sites identified, calcs associated with DCIS, and benign nipple/skin; margins clear. LN neg (0/2).  Patient declined genetic testing. S/p XRT. No AI given HR- tumor.   Patient without complaint. Physical exam WNL. Sozo measurement obtained in office today, WNL. Plan for B/L DX MMG/US in November 2024, and if benign plan for re-examination and repeat sozo measurement in 6 months (March 2025). Patient requesting PCP referral, [facility]. Patient verbalizes understanding and is in agreement with the plan.

## 2024-09-29 NOTE — HISTORY OF PRESENT ILLNESS
[FreeTextEntry1] : 64 year old female presents for follow up evaluation s/p Left central localized lumpectomy + L SLNB with right symmetry reduction (Dr. Blakely) on 5/24/24 of Left DCIS, intermediate nuclear grade and necrosis with associated calcifications, ER neg, MS neg; initially seen on screening mammo with subsequent dx imaging and proven on stereo bx x2 sites (anterior and posterior) of L 6:00 1cmfn 10cm branching calcifications.  Final surgical pathology yielded 2.0cm DCIS, flat and micropapillary patterns with high nuclear grade and central necrosis, ER- MS-; two biopsy sites identified, calcs associated with DCIS, and benign nipple/skin; margins clear. LN neg (0/2).  Patient s/p XRT (Dr. Carrillo), completed 9/4/24. Patient met with med onc Dr. Prasad who discussed no indication for adjuvant endocrine therapy given tumor is ER/MS negative.   Of note, at initial office visit the patient reported one incidence of clear, non-spontaneous, non-bloody L nipple discharge in late Nov 2023. Patient denies palpable masses or skin changes.   Patient reports family history of breast cancer in 4 maternal first cousins - age 20's, 30's, 40's and 50's respectively. Denies famhx of ovarian cancer. Patient has 1 biological child (daughter age 46). Declined genetic testing.   Patient reports history of HTN, acid reflux. Denies history of heart disease, DM, blood clots, sleep apnea, COPD, issues with anesthesia. Patient denies any known drug allergies.  Patient has been set up with [employer].   TNM Stage: p T is N 0 M X AJCC Stage (8th Ed): 0.

## 2024-09-29 NOTE — PLAN
[TextEntry] : - sozo today - B/L DX MMG/US Nov 2024  - If benign, RTC in March 2025 (+sozo) -  to refer to PCP via [facility]

## 2024-09-29 NOTE — HISTORY OF PRESENT ILLNESS
[FreeTextEntry1] : 64 year old female presents for follow up evaluation s/p Left central localized lumpectomy + L SLNB with right symmetry reduction (Dr. Blakely) on 5/24/24 of Left DCIS, intermediate nuclear grade and necrosis with associated calcifications, ER neg, SC neg; initially seen on screening mammo with subsequent dx imaging and proven on stereo bx x2 sites (anterior and posterior) of L 6:00 1cmfn 10cm branching calcifications.  Final surgical pathology yielded 2.0cm DCIS, flat and micropapillary patterns with high nuclear grade and central necrosis, ER- SC-; two biopsy sites identified, calcs associated with DCIS, and benign nipple/skin; margins clear. LN neg (0/2).  Patient s/p XRT (Dr. Carrillo), completed 9/4/24. Patient met with med onc Dr. Prasad who discussed no indication for adjuvant endocrine therapy given tumor is ER/SC negative.   Of note, at initial office visit the patient reported one incidence of clear, non-spontaneous, non-bloody L nipple discharge in late Nov 2023. Patient denies palpable masses or skin changes.   Patient reports family history of breast cancer in 4 maternal first cousins - age 20's, 30's, 40's and 50's respectively. Denies famhx of ovarian cancer. Patient has 1 biological child (daughter age 46). Declined genetic testing.   Patient reports history of HTN, acid reflux. Denies history of heart disease, DM, blood clots, sleep apnea, COPD, issues with anesthesia. Patient denies any known drug allergies.  Patient has been set up with [facility]   TNM Stage: p T is N 0 M X AJCC Stage (8th Ed): 0.

## 2024-09-29 NOTE — DATA REVIEWED
[FreeTextEntry1] : 11/22/23 ([facility]) B/L sMMG: heterogeneously dense. L breast questionable calcifications in lower inner and lower outer breast spanning anterior through mid depth. BI-RADS 0, incomplete FOLLOW UP: additional imaging views and possible L breast stereotactic guided core biopsy.  01/03/24 ([facility]) Left DX MMG: There are scattered areas of fibroglandular density. There are fine linear branching calcifications extending from within 15 mm of the nipple along 6:00, total extent approximately 10 cm, highly suspicious for DCIS. BI-RADS 4C, Suspicious. FOLLOW UP: Recommend 2 site stereotactic biopsy of the anterior and posterior extent.  01/12/24 ([facility]) L stereotactic biopsy x 2 sites: Site 1 - Left 6:00, 10cm area of branching calficications, posterior: DCIS, cribriform and micropapillary types, intermediate nuclear grade and marked necrosis; calcifications associated with DCIS Site 2 - Left 6:00, 10cm area of branching calcifications, anterior: DCIS, intermediate nuclear grade and necrosis; calcifications associated with DCIS. Concordant, malignant. RECOMMENDATIONL Surgical or oncologic management. ER neg/IN neg   5/24/24 ([facility]) Final surgical pathology: 2.0cm DCIS, flat and micropapillary patterns with high nuclear grade and central necrosis, ER- IN-; two biopsy sites identified, calcs associated with DCIS, and benign nipple/skin; margins clear. Lymph nodes negative (0/2).

## 2024-10-03 ENCOUNTER — NON-APPOINTMENT (OUTPATIENT)
Age: 64
End: 2024-10-03

## 2024-10-04 ENCOUNTER — NON-APPOINTMENT (OUTPATIENT)
Age: 64
End: 2024-10-04

## 2024-11-06 ENCOUNTER — APPOINTMENT (OUTPATIENT)
Dept: RADIATION ONCOLOGY | Facility: CLINIC | Age: 64
End: 2024-11-06

## 2024-11-19 ENCOUNTER — APPOINTMENT (OUTPATIENT)
Dept: RADIATION ONCOLOGY | Facility: CLINIC | Age: 64
End: 2024-11-19
Payer: MEDICARE

## 2024-11-19 ENCOUNTER — LABORATORY RESULT (OUTPATIENT)
Age: 64
End: 2024-11-19

## 2024-11-19 ENCOUNTER — APPOINTMENT (OUTPATIENT)
Dept: NEPHROLOGY | Facility: CLINIC | Age: 64
End: 2024-11-19
Payer: MEDICARE

## 2024-11-19 VITALS
SYSTOLIC BLOOD PRESSURE: 140 MMHG | TEMPERATURE: 98 F | DIASTOLIC BLOOD PRESSURE: 80 MMHG | WEIGHT: 178 LBS | BODY MASS INDEX: 26.98 KG/M2 | RESPIRATION RATE: 18 BRPM | HEIGHT: 68 IN | HEART RATE: 84 BPM | OXYGEN SATURATION: 100 %

## 2024-11-19 VITALS — WEIGHT: 180 LBS | BODY MASS INDEX: 27.37 KG/M2

## 2024-11-19 VITALS — HEART RATE: 72 BPM | DIASTOLIC BLOOD PRESSURE: 78 MMHG | SYSTOLIC BLOOD PRESSURE: 130 MMHG

## 2024-11-19 VITALS — SYSTOLIC BLOOD PRESSURE: 140 MMHG | HEART RATE: 72 BPM | DIASTOLIC BLOOD PRESSURE: 86 MMHG

## 2024-11-19 VITALS — HEART RATE: 84 BPM | SYSTOLIC BLOOD PRESSURE: 140 MMHG | DIASTOLIC BLOOD PRESSURE: 80 MMHG

## 2024-11-19 DIAGNOSIS — I10 ESSENTIAL (PRIMARY) HYPERTENSION: ICD-10-CM

## 2024-11-19 DIAGNOSIS — D05.12 INTRADUCTAL CARCINOMA IN SITU OF LEFT BREAST: ICD-10-CM

## 2024-11-19 DIAGNOSIS — R92.8 OTHER ABNORMAL AND INCONCLUSIVE FINDINGS ON DIAGNOSTIC IMAGING OF BREAST: ICD-10-CM

## 2024-11-19 DIAGNOSIS — R13.10 DYSPHAGIA, UNSPECIFIED: ICD-10-CM

## 2024-11-19 PROCEDURE — 36415 COLL VENOUS BLD VENIPUNCTURE: CPT

## 2024-11-19 PROCEDURE — 99205 OFFICE O/P NEW HI 60 MIN: CPT

## 2024-11-19 PROCEDURE — G2211 COMPLEX E/M VISIT ADD ON: CPT

## 2024-11-19 PROCEDURE — 99024 POSTOP FOLLOW-UP VISIT: CPT

## 2024-11-20 LAB
24R-OH-CALCIDIOL SERPL-MCNC: 58.3 PG/ML
25(OH)D3 SERPL-MCNC: 18.4 NG/ML
ALBUMIN SERPL ELPH-MCNC: 4.1 G/DL
ALP BLD-CCNC: 67 U/L
ALT SERPL-CCNC: 15 U/L
ANION GAP SERPL CALC-SCNC: 15 MMOL/L
AST SERPL-CCNC: 17 U/L
BILIRUB SERPL-MCNC: 0.4 MG/DL
BUN SERPL-MCNC: 10 MG/DL
C3 SERPL-MCNC: 130 MG/DL
C4 SERPL-MCNC: 32 MG/DL
CALCIUM SERPL-MCNC: 9.2 MG/DL
CALCIUM SERPL-MCNC: 9.2 MG/DL
CHLORIDE SERPL-SCNC: 103 MMOL/L
CHOLEST SERPL-MCNC: 196 MG/DL
CO2 SERPL-SCNC: 22 MMOL/L
CREAT SERPL-MCNC: 0.93 MG/DL
CYSTATIN C SERPL-MCNC: 0.81 MG/L
EGFR: 68 ML/MIN/1.73M2
ESTIMATED AVERAGE GLUCOSE: 120 MG/DL
FERRITIN SERPL-MCNC: 144 NG/ML
FOLATE SERPL-MCNC: 11.1 NG/ML
GFR/BSA.PRED SERPLBLD CYS-BASED-ARV: 94 ML/MIN/1.73M2
GLUCOSE SERPL-MCNC: 77 MG/DL
HBA1C MFR BLD HPLC: 5.8 %
HBV SURFACE AB SER QL: NONREACTIVE
HBV SURFACE AG SER QL: NONREACTIVE
HCT VFR BLD CALC: 38.9 %
HCV AB SER QL: NONREACTIVE
HCV S/CO RATIO: 0.11 S/CO
HCYS SERPL-MCNC: 13.8 UMOL/L
HDLC SERPL-MCNC: 64 MG/DL
HGB BLD-MCNC: 12.3 G/DL
IRON SATN MFR SERPL: 26 %
IRON SERPL-MCNC: 69 UG/DL
LDLC SERPL CALC-MCNC: 109 MG/DL
MAGNESIUM SERPL-MCNC: 2.2 MG/DL
MCHC RBC-ENTMCNC: 30.1 PG
MCHC RBC-ENTMCNC: 31.6 G/DL
MCV RBC AUTO: 95.3 FL
NONHDLC SERPL-MCNC: 133 MG/DL
PARATHYROID HORMONE INTACT: 48 PG/ML
PHOSPHATE SERPL-MCNC: 3.8 MG/DL
PLATELET # BLD AUTO: 164 K/UL
POTASSIUM SERPL-SCNC: 3.9 MMOL/L
PROT SERPL-MCNC: 7.1 G/DL
RBC # BLD: 4.08 M/UL
RBC # FLD: 13.2 %
RENIN PLASMA: <2.1 PG/ML
RHEUMATOID FACT SER QL: <10 IU/ML
SODIUM SERPL-SCNC: 140 MMOL/L
T3FREE SERPL-MCNC: 2.75 PG/ML
T3RU NFR SERPL: 1.1 TBI
T4 FREE SERPL-MCNC: 1.1 NG/DL
T4 SERPL-MCNC: 8.1 UG/DL
THYROGLOB AB SERPL-ACNC: <15 IU/ML
THYROPEROXIDASE AB SERPL IA-ACNC: 12.5 IU/ML
TIBC SERPL-MCNC: 267 UG/DL
TRIGL SERPL-MCNC: 137 MG/DL
TSH SERPL-ACNC: 1.75 UIU/ML
UIBC SERPL-MCNC: 198 UG/DL
URATE SERPL-MCNC: 6.4 MG/DL
VIT B12 SERPL-MCNC: 523 PG/ML
WBC # FLD AUTO: 4.8 K/UL

## 2024-11-21 LAB
ALBUMIN MFR SERPL ELPH: 58.7 %
ALBUMIN SERPL-MCNC: 4.2 G/DL
ALBUMIN/GLOB SERPL: 1.4 RATIO
ALDOSTERONE SERUM: 7.9 NG/DL
ALPHA1 GLOB MFR SERPL ELPH: 3.5 %
ALPHA1 GLOB SERPL ELPH-MCNC: 0.2 G/DL
ALPHA2 GLOB MFR SERPL ELPH: 6.9 %
ALPHA2 GLOB SERPL ELPH-MCNC: 0.5 G/DL
B-GLOBULIN MFR SERPL ELPH: 12.8 %
B-GLOBULIN SERPL ELPH-MCNC: 0.9 G/DL
DEPRECATED KAPPA LC FREE/LAMBDA SER: 1.55 RATIO
GAMMA GLOB FLD ELPH-MCNC: 1.3 G/DL
GAMMA GLOB MFR SERPL ELPH: 18.1 %
IGA SER QL IEP: 369 MG/DL
IGG SER QL IEP: 1251 MG/DL
IGM SER QL IEP: 84 MG/DL
INTERPRETATION SERPL IEP-IMP: NORMAL
KAPPA LC CSF-MCNC: 1.43 MG/DL
KAPPA LC SERPL-MCNC: 2.21 MG/DL
M PROTEIN SPEC IFE-MCNC: NORMAL
PROT SERPL-MCNC: 7.1 G/DL
PROT SERPL-MCNC: 7.1 G/DL

## 2024-11-22 LAB
ANA SER IF-ACNC: NEGATIVE
METHYLMALONATE SERPL-SCNC: 162 NMOL/L

## 2024-11-23 LAB — ALP BONE SERPL-MCNC: 14 UG/L

## 2024-11-29 LAB — COLLAGEN CTX SERPL-MCNC: 558 PG/ML

## 2024-12-19 ENCOUNTER — RESULT REVIEW (OUTPATIENT)
Age: 64
End: 2024-12-19

## 2024-12-19 ENCOUNTER — APPOINTMENT (OUTPATIENT)
Dept: ULTRASOUND IMAGING | Facility: HOSPITAL | Age: 64
End: 2024-12-19

## 2024-12-19 ENCOUNTER — APPOINTMENT (OUTPATIENT)
Dept: MAMMOGRAPHY | Facility: HOSPITAL | Age: 64
End: 2024-12-19

## 2024-12-19 ENCOUNTER — OUTPATIENT (OUTPATIENT)
Dept: OUTPATIENT SERVICES | Facility: HOSPITAL | Age: 64
LOS: 1 days | End: 2024-12-19

## 2024-12-19 DIAGNOSIS — Z98.890 OTHER SPECIFIED POSTPROCEDURAL STATES: Chronic | ICD-10-CM

## 2024-12-19 DIAGNOSIS — Z98.51 TUBAL LIGATION STATUS: Chronic | ICD-10-CM

## 2024-12-19 DIAGNOSIS — M20.42 OTHER HAMMER TOE(S) (ACQUIRED), LEFT FOOT: Chronic | ICD-10-CM

## 2024-12-19 PROCEDURE — 76641 ULTRASOUND BREAST COMPLETE: CPT

## 2024-12-19 PROCEDURE — 76641 ULTRASOUND BREAST COMPLETE: CPT | Mod: 26,50,GZ

## 2024-12-19 PROCEDURE — G0279: CPT

## 2024-12-19 PROCEDURE — G0279: CPT | Mod: 26

## 2024-12-19 PROCEDURE — 77066 DX MAMMO INCL CAD BI: CPT | Mod: 26

## 2024-12-19 PROCEDURE — 77066 DX MAMMO INCL CAD BI: CPT

## 2025-02-12 ENCOUNTER — NON-APPOINTMENT (OUTPATIENT)
Age: 65
End: 2025-02-12

## 2025-02-13 ENCOUNTER — NON-APPOINTMENT (OUTPATIENT)
Age: 65
End: 2025-02-13

## 2025-02-17 ENCOUNTER — EMERGENCY (EMERGENCY)
Facility: HOSPITAL | Age: 65
LOS: 1 days | Discharge: HOME | End: 2025-02-17
Attending: STUDENT IN AN ORGANIZED HEALTH CARE EDUCATION/TRAINING PROGRAM | Admitting: STUDENT IN AN ORGANIZED HEALTH CARE EDUCATION/TRAINING PROGRAM
Payer: SELF-PAY

## 2025-02-17 VITALS
RESPIRATION RATE: 18 BRPM | HEART RATE: 71 BPM | TEMPERATURE: 98 F | WEIGHT: 184.97 LBS | SYSTOLIC BLOOD PRESSURE: 125 MMHG | HEIGHT: 68 IN | DIASTOLIC BLOOD PRESSURE: 72 MMHG | OXYGEN SATURATION: 100 %

## 2025-02-17 DIAGNOSIS — Z98.890 OTHER SPECIFIED POSTPROCEDURAL STATES: Chronic | ICD-10-CM

## 2025-02-17 DIAGNOSIS — M20.42 OTHER HAMMER TOE(S) (ACQUIRED), LEFT FOOT: Chronic | ICD-10-CM

## 2025-02-17 DIAGNOSIS — Z98.51 TUBAL LIGATION STATUS: Chronic | ICD-10-CM

## 2025-02-17 PROCEDURE — 73030 X-RAY EXAM OF SHOULDER: CPT | Mod: 26

## 2025-02-17 PROCEDURE — 99284 EMERGENCY DEPT VISIT MOD MDM: CPT

## 2025-02-17 RX ORDER — IBUPROFEN 600 MG/1
600 TABLET, FILM COATED ORAL ONCE
Refills: 0 | Status: COMPLETED | OUTPATIENT
Start: 2025-02-17 | End: 2025-02-17

## 2025-02-17 RX ADMIN — IBUPROFEN 600 MILLIGRAM(S): 600 TABLET, FILM COATED ORAL at 14:42

## 2025-02-17 NOTE — ED PROVIDER NOTE - PATIENT PORTAL LINK FT
You can access the FollowMyHealth Patient Portal offered by [facility] by registering at the following website: [email or web address]. By joining [employer]’s FollowMyHealth portal, you will also be able to view your health information using other applications (apps) compatible with our system.

## 2025-02-17 NOTE — ED PROVIDER NOTE - NSPTACCESSSVCSAPPTDETAILS_ED_ALL_ED_FT
Follow-up with your primary care provider as scheduled next week for further evaluation and management.
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
## 2025-02-17 NOTE — ED PROVIDER NOTE - NSICDXPASTMEDICALHX_GEN_ALL_CORE_FT
PAST MEDICAL HISTORY:  Breast CA left    GERD (gastroesophageal reflux disease)     HTN (hypertension)     Neck mass removed 2023
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
## 2025-02-19 DIAGNOSIS — S40.012A CONTUSION OF LEFT SHOULDER, INITIAL ENCOUNTER: ICD-10-CM

## 2025-02-19 DIAGNOSIS — M25.512 PAIN IN LEFT SHOULDER: ICD-10-CM

## 2025-02-19 DIAGNOSIS — L98.7 EXCESSIVE AND REDUNDANT SKIN AND SUBCUTANEOUS TISSUE: ICD-10-CM

## 2025-02-19 PROCEDURE — 99283 EMERGENCY DEPT VISIT LOW MDM: CPT | Mod: 25

## 2025-02-19 PROCEDURE — 73030 X-RAY EXAM OF SHOULDER: CPT

## 2025-02-26 ENCOUNTER — APPOINTMENT (OUTPATIENT)
Dept: NEPHROLOGY | Facility: CLINIC | Age: 65
End: 2025-02-26
Payer: MEDICARE

## 2025-02-26 ENCOUNTER — LABORATORY RESULT (OUTPATIENT)
Age: 65
End: 2025-02-26

## 2025-02-26 DIAGNOSIS — Z00.00 ENCOUNTER FOR GENERAL ADULT MEDICAL EXAMINATION W/OUT ABNORMAL FINDINGS: ICD-10-CM

## 2025-02-26 DIAGNOSIS — F43.9 REACTION TO SEVERE STRESS, UNSPECIFIED: ICD-10-CM

## 2025-02-26 DIAGNOSIS — I10 ESSENTIAL (PRIMARY) HYPERTENSION: ICD-10-CM

## 2025-02-26 DIAGNOSIS — A04.8 OTHER SPECIFIED BACTERIAL INTESTINAL INFECTIONS: ICD-10-CM

## 2025-02-26 PROCEDURE — 99214 OFFICE O/P EST MOD 30 MIN: CPT

## 2025-02-26 PROCEDURE — G2211 COMPLEX E/M VISIT ADD ON: CPT

## 2025-02-26 PROCEDURE — 36415 COLL VENOUS BLD VENIPUNCTURE: CPT

## 2025-02-27 ENCOUNTER — APPOINTMENT (OUTPATIENT)
Dept: PLASTIC SURGERY | Facility: CLINIC | Age: 65
End: 2025-02-27
Payer: MEDICARE

## 2025-02-27 LAB
ALBUMIN SERPL ELPH-MCNC: 4.3 G/DL
ALP BLD-CCNC: 80 U/L
ALT SERPL-CCNC: 13 U/L
ANION GAP SERPL CALC-SCNC: 16 MMOL/L
APPEARANCE: CLEAR
AST SERPL-CCNC: 15 U/L
BACTERIA: NEGATIVE /HPF
BILIRUB SERPL-MCNC: 0.2 MG/DL
BILIRUBIN URINE: NEGATIVE
BLOOD URINE: NEGATIVE
BUN SERPL-MCNC: 9 MG/DL
CALCIUM SERPL-MCNC: 9.4 MG/DL
CAST: 0 /LPF
CHLORIDE SERPL-SCNC: 105 MMOL/L
CHOLEST SERPL-MCNC: 227 MG/DL
CO2 SERPL-SCNC: 22 MMOL/L
COLOR: YELLOW
CREAT SERPL-MCNC: 1.02 MG/DL
CREAT SPEC-SCNC: 164 MG/DL
CREAT/PROT UR: 0 RATIO
CYSTATIN C SERPL-MCNC: 0.79 MG/L
EGFR: 61 ML/MIN/1.73M2
EPITHELIAL CELLS: 1 /HPF
ESTIMATED AVERAGE GLUCOSE: 114 MG/DL
GFR/BSA.PRED SERPLBLD CYS-BASED-ARV: 96 ML/MIN/1.73M2
GLUCOSE QUALITATIVE U: NEGATIVE MG/DL
GLUCOSE SERPL-MCNC: 106 MG/DL
HBA1C MFR BLD HPLC: 5.6 %
HCT VFR BLD CALC: 40.9 %
HDLC SERPL-MCNC: 58 MG/DL
HGB BLD-MCNC: 13.6 G/DL
KETONES URINE: NEGATIVE MG/DL
LDLC SERPL CALC-MCNC: 113 MG/DL
LEUKOCYTE ESTERASE URINE: NEGATIVE
MAGNESIUM SERPL-MCNC: 2.1 MG/DL
MCHC RBC-ENTMCNC: 31.5 PG
MCHC RBC-ENTMCNC: 33.3 G/DL
MCV RBC AUTO: 94.7 FL
MICROSCOPIC-UA: NORMAL
NITRITE URINE: NEGATIVE
NONHDLC SERPL-MCNC: 168 MG/DL
PH URINE: 5.5
PHOSPHATE SERPL-MCNC: 3.3 MG/DL
PLATELET # BLD AUTO: 203 K/UL
POTASSIUM SERPL-SCNC: 3.9 MMOL/L
PROT SERPL-MCNC: 7.4 G/DL
PROT UR-MCNC: 7 MG/DL
PROTEIN URINE: NEGATIVE MG/DL
RBC # BLD: 4.32 M/UL
RBC # FLD: 13.3 %
RED BLOOD CELLS URINE: 2 /HPF
SODIUM SERPL-SCNC: 142 MMOL/L
SPECIFIC GRAVITY URINE: 1.02
T3FREE SERPL-MCNC: 2.46 PG/ML
T3RU NFR SERPL: 1.1 TBI
T4 FREE SERPL-MCNC: 1 NG/DL
T4 SERPL-MCNC: 7.2 UG/DL
THYROGLOB AB SERPL-ACNC: 16.1 IU/ML
THYROPEROXIDASE AB SERPL IA-ACNC: 15.6 IU/ML
TRIGL SERPL-MCNC: 320 MG/DL
TSH SERPL-ACNC: 1.95 UIU/ML
URATE SERPL-MCNC: 6.2 MG/DL
UROBILINOGEN URINE: 0.2 MG/DL
WBC # FLD AUTO: 4.61 K/UL
WHITE BLOOD CELLS URINE: 0 /HPF

## 2025-02-27 PROCEDURE — 99212 OFFICE O/P EST SF 10 MIN: CPT

## 2025-04-21 ENCOUNTER — APPOINTMENT (OUTPATIENT)
Dept: OTOLARYNGOLOGY | Facility: CLINIC | Age: 65
End: 2025-04-21

## 2025-04-21 VITALS
DIASTOLIC BLOOD PRESSURE: 93 MMHG | SYSTOLIC BLOOD PRESSURE: 136 MMHG | HEIGHT: 68 IN | HEART RATE: 70 BPM | WEIGHT: 184.8 LBS | BODY MASS INDEX: 28.01 KG/M2

## 2025-04-21 PROCEDURE — 31575 DIAGNOSTIC LARYNGOSCOPY: CPT

## 2025-04-21 PROCEDURE — 99214 OFFICE O/P EST MOD 30 MIN: CPT | Mod: 25

## 2025-04-28 ENCOUNTER — APPOINTMENT (OUTPATIENT)
Dept: OTOLARYNGOLOGY | Facility: CLINIC | Age: 65
End: 2025-04-28

## 2025-05-12 ENCOUNTER — NON-APPOINTMENT (OUTPATIENT)
Age: 65
End: 2025-05-12

## 2025-05-12 ENCOUNTER — APPOINTMENT (OUTPATIENT)
Dept: OTOLARYNGOLOGY | Facility: CLINIC | Age: 65
End: 2025-05-12
Payer: MEDICARE

## 2025-05-12 DIAGNOSIS — J01.00 ACUTE MAXILLARY SINUSITIS, UNSPECIFIED: ICD-10-CM

## 2025-05-12 PROCEDURE — 31231 NASAL ENDOSCOPY DX: CPT

## 2025-05-12 PROCEDURE — 99213 OFFICE O/P EST LOW 20 MIN: CPT | Mod: 25

## 2025-05-12 RX ORDER — AMOXICILLIN AND CLAVULANATE POTASSIUM 875; 125 MG/1; MG/1
875-125 TABLET, COATED ORAL
Qty: 20 | Refills: 0 | Status: ACTIVE | COMMUNITY
Start: 2025-05-12 | End: 1900-01-01

## 2025-06-04 ENCOUNTER — APPOINTMENT (OUTPATIENT)
Dept: BREAST CENTER | Facility: CLINIC | Age: 65
End: 2025-06-04

## 2025-06-04 VITALS — HEIGHT: 68 IN | BODY MASS INDEX: 28.79 KG/M2 | WEIGHT: 190 LBS

## 2025-06-04 DIAGNOSIS — Z80.3 FAMILY HISTORY OF MALIGNANT NEOPLASM OF BREAST: ICD-10-CM

## 2025-06-04 DIAGNOSIS — Z86.000 ENCOUNTER FOR FOLLOW-UP EXAMINATION AFTER COMPLETED TREATMENT FOR MALIGNANT NEOPLASM: ICD-10-CM

## 2025-06-04 DIAGNOSIS — Z08 ENCOUNTER FOR FOLLOW-UP EXAMINATION AFTER COMPLETED TREATMENT FOR MALIGNANT NEOPLASM: ICD-10-CM

## 2025-06-04 DIAGNOSIS — Z00.00 ENCOUNTER FOR GENERAL ADULT MEDICAL EXAMINATION W/OUT ABNORMAL FINDINGS: ICD-10-CM

## 2025-06-04 DIAGNOSIS — D05.12 INTRADUCTAL CARCINOMA IN SITU OF LEFT BREAST: ICD-10-CM

## 2025-06-04 PROCEDURE — 93702 BIS XTRACELL FLUID ANALYSIS: CPT

## 2025-06-04 PROCEDURE — 99214 OFFICE O/P EST MOD 30 MIN: CPT

## 2025-06-16 ENCOUNTER — APPOINTMENT (OUTPATIENT)
Dept: OTOLARYNGOLOGY | Facility: CLINIC | Age: 65
End: 2025-06-16

## 2025-06-16 VITALS — BODY MASS INDEX: 28.04 KG/M2 | WEIGHT: 185 LBS | HEIGHT: 68 IN

## 2025-06-16 PROCEDURE — 31231 NASAL ENDOSCOPY DX: CPT

## 2025-06-16 PROCEDURE — 99214 OFFICE O/P EST MOD 30 MIN: CPT | Mod: 25

## 2025-06-21 ENCOUNTER — OUTPATIENT (OUTPATIENT)
Dept: OUTPATIENT SERVICES | Facility: HOSPITAL | Age: 65
LOS: 1 days | End: 2025-06-21
Payer: MEDICARE

## 2025-06-21 ENCOUNTER — APPOINTMENT (OUTPATIENT)
Dept: CT IMAGING | Facility: HOSPITAL | Age: 65
End: 2025-06-21

## 2025-06-21 DIAGNOSIS — Z98.890 OTHER SPECIFIED POSTPROCEDURAL STATES: Chronic | ICD-10-CM

## 2025-06-21 DIAGNOSIS — Z98.51 TUBAL LIGATION STATUS: Chronic | ICD-10-CM

## 2025-06-21 DIAGNOSIS — M20.42 OTHER HAMMER TOE(S) (ACQUIRED), LEFT FOOT: Chronic | ICD-10-CM

## 2025-06-21 PROCEDURE — 70486 CT MAXILLOFACIAL W/O DYE: CPT

## 2025-06-21 PROCEDURE — 70486 CT MAXILLOFACIAL W/O DYE: CPT | Mod: 26

## 2025-07-08 ENCOUNTER — APPOINTMENT (OUTPATIENT)
Dept: OTOLARYNGOLOGY | Facility: CLINIC | Age: 65
End: 2025-07-08
Payer: MEDICARE

## 2025-07-08 PROBLEM — R09.82 POSTNASAL DRIP: Status: ACTIVE | Noted: 2025-06-16

## 2025-07-08 PROBLEM — R05.3 CHRONIC COUGHING: Status: ACTIVE | Noted: 2025-06-16

## 2025-07-08 PROCEDURE — 99214 OFFICE O/P EST MOD 30 MIN: CPT

## 2025-08-19 ENCOUNTER — APPOINTMENT (OUTPATIENT)
Dept: GASTROENTEROLOGY | Facility: CLINIC | Age: 65
End: 2025-08-19

## 2025-08-29 ENCOUNTER — NON-APPOINTMENT (OUTPATIENT)
Age: 65
End: 2025-08-29

## 2025-09-12 ENCOUNTER — APPOINTMENT (OUTPATIENT)
Dept: GASTROENTEROLOGY | Facility: CLINIC | Age: 65
End: 2025-09-12

## 2025-09-16 ENCOUNTER — APPOINTMENT (OUTPATIENT)
Dept: GASTROENTEROLOGY | Facility: CLINIC | Age: 65
End: 2025-09-16
Payer: MEDICARE

## 2025-09-16 VITALS
HEART RATE: 73 BPM | BODY MASS INDEX: 28.64 KG/M2 | DIASTOLIC BLOOD PRESSURE: 82 MMHG | OXYGEN SATURATION: 97 % | SYSTOLIC BLOOD PRESSURE: 134 MMHG | RESPIRATION RATE: 16 BRPM | WEIGHT: 189 LBS | TEMPERATURE: 96 F | HEIGHT: 68 IN

## 2025-09-16 DIAGNOSIS — Z12.12 ENCOUNTER FOR SCREENING FOR MALIGNANT NEOPLASM OF COLON: ICD-10-CM

## 2025-09-16 DIAGNOSIS — K21.9 GASTRO-ESOPHAGEAL REFLUX DISEASE W/OUT ESOPHAGITIS: ICD-10-CM

## 2025-09-16 DIAGNOSIS — Z12.11 ENCOUNTER FOR SCREENING FOR MALIGNANT NEOPLASM OF COLON: ICD-10-CM

## 2025-09-16 PROCEDURE — 99215 OFFICE O/P EST HI 40 MIN: CPT

## 2025-09-16 PROCEDURE — G2211 COMPLEX E/M VISIT ADD ON: CPT

## 2025-09-16 RX ORDER — POLYETHYLENE GLYCOL 3350 AND ELECTROLYTES WITH LEMON FLAVOR 236; 22.74; 6.74; 5.86; 2.97 G/4L; G/4L; G/4L; G/4L; G/4L
236 POWDER, FOR SOLUTION ORAL
Qty: 1 | Refills: 0 | Status: ACTIVE | COMMUNITY
Start: 2025-09-16 | End: 1900-01-01

## 2025-09-22 RX ORDER — OMEPRAZOLE 40 MG/1
40 CAPSULE, DELAYED RELEASE ORAL
Qty: 90 | Refills: 0 | Status: ACTIVE | COMMUNITY
Start: 2025-09-16 | End: 1900-01-01

## (undated) DEVICE — DRSG TELFA 3 X 8

## (undated) DEVICE — DRSG GAUZE FLUFF 1PLY 18X30"

## (undated) DEVICE — MARKING PEN W RULER

## (undated) DEVICE — VENODYNE/SCD SLEEVE CALF MEDIUM

## (undated) DEVICE — SUT SILK 3-0 18" TIES

## (undated) DEVICE — SUT PROLENE 4-0 18" PS-2

## (undated) DEVICE — GLV 7.5 PROTEXIS (WHITE)

## (undated) DEVICE — DRAPE TOWEL BLUE 17" X 24"

## (undated) DEVICE — GLV 6.5 PROTEXIS (WHITE)

## (undated) DEVICE — SUT MONOCRYL 5-0 UNDYED 1X18IN PC-3 12/BX

## (undated) DEVICE — NDL HYPO REGULAR BEVEL 25G X 1.5" (BLUE)

## (undated) DEVICE — SUCTION YANKAUER BULBOUS TIP W VENT

## (undated) DEVICE — ELCTR STRYKER NEPTUNE SMOKE EVACUATION PENCIL (GREEN)

## (undated) DEVICE — WARMING BLANKET LOWER ADULT

## (undated) DEVICE — SUT SILK 2-0 30" SH

## (undated) DEVICE — SPONGE PEANUT AUTO COUNT

## (undated) DEVICE — INVUITY ILLUMINATOR EIGR WAVEGUIDE, WIDE/FLAT DISP

## (undated) DEVICE — TUBING SUCTION NONCONDUCTIVE 6MM X 12FT

## (undated) DEVICE — DRSG DERMABOND 0.7ML

## (undated) DEVICE — SUT SILK 2-0 30" PSL

## (undated) DEVICE — DRSG COMBINE 5X9"

## (undated) DEVICE — DRAPE MAGNETIC INSTRUMENT MEDIUM

## (undated) DEVICE — PACK GENERAL MINOR

## (undated) DEVICE — SAFETY PIN 1.5" MED

## (undated) DEVICE — SUT VICRYL 2-0 27" SH UNDYED

## (undated) DEVICE — TUBING SMOKE EVAC 3/8" X 10FT FOR NEPTUNE

## (undated) DEVICE — DRSG TEGADERM 2.5X3"

## (undated) DEVICE — Device

## (undated) DEVICE — PACK UPPER BODY

## (undated) DEVICE — SUT VICRYL PLUS 4-0 27" SH UNDYED

## (undated) DEVICE — WOUND IRR IRRISEPT W 0.5 CHG

## (undated) DEVICE — POSITIONER FOAM EGG CRATE ULNAR 2PCS (PINK)

## (undated) DEVICE — SPONGE ENDO PEANUT 5MM

## (undated) DEVICE — DRSG KERLIX ROLL 4.5"

## (undated) DEVICE — SUT VICRYL 4-0 18" PS-2 UNDYED

## (undated) DEVICE — PACK BREAST RECONSTRUCTION

## (undated) DEVICE — DRSG STERISTRIPS 0.5 X 4"

## (undated) DEVICE — SUT VICRYL PLUS 2-0 27" SH UNDYED

## (undated) DEVICE — SUT SILK 2-0 12-18"

## (undated) DEVICE — DRAPE PROBE COVER LATEX FREE 3X96"

## (undated) DEVICE — GLV 8 PROTEXIS (CREAM) MICRO

## (undated) DEVICE — DRAIN RESERVOIR FOR JACKSON PRATT 100CC CARDINAL

## (undated) DEVICE — ELCTR BOVIE TIP BLADE INSULATED 2.75" EDGE

## (undated) DEVICE — GLV 8 SENSICARE W ALOE

## (undated) DEVICE — BLADE PHOTON 7.5IN / 10.5IN

## (undated) DEVICE — DRAIN SILICONE ROUND 9FR

## (undated) DEVICE — DRSG ACE BANDAGE 6"

## (undated) DEVICE — SYR LUER LOK 10CC

## (undated) DEVICE — POSITIONER PATIENT SAFETY STRAP 3X60"

## (undated) DEVICE — GLV 8 PROTEXIS (WHITE)

## (undated) DEVICE — SUT CHROMIC 3-0 27" SH

## (undated) DEVICE — ELCTR GROUNDING PAD ADULT COVIDIEN

## (undated) DEVICE — LIGASURE EXACT DISSECTOR

## (undated) DEVICE — STAPLER SKIN PROXIMATE

## (undated) DEVICE — SUT MONOCRYL 4-0 18" PS-2

## (undated) DEVICE — SUT CHROMIC 4-0 27" SH-1

## (undated) DEVICE — ONETRAC LIGHTED RETRACTOR 135 X 30MM DISP

## (undated) DEVICE — DRSG GAUZE MOISTURIZER 0.5 OZ 4X8

## (undated) DEVICE — SYR LUER LOK 50CC

## (undated) DEVICE — BLADE SURGICAL #15 CARBON

## (undated) DEVICE — SUT ENSEAL CVD 14CM

## (undated) DEVICE — SYR LUER LOK 5CC

## (undated) DEVICE — DRAPE CAMERA VIDEO 7"X96"